# Patient Record
Sex: MALE | Race: BLACK OR AFRICAN AMERICAN | NOT HISPANIC OR LATINO
[De-identification: names, ages, dates, MRNs, and addresses within clinical notes are randomized per-mention and may not be internally consistent; named-entity substitution may affect disease eponyms.]

---

## 2017-03-16 ENCOUNTER — APPOINTMENT (OUTPATIENT)
Dept: UROLOGY | Facility: CLINIC | Age: 67
End: 2017-03-16

## 2017-03-16 VITALS
SYSTOLIC BLOOD PRESSURE: 127 MMHG | DIASTOLIC BLOOD PRESSURE: 74 MMHG | TEMPERATURE: 98.4 F | RESPIRATION RATE: 17 BRPM | HEART RATE: 84 BPM

## 2017-03-16 DIAGNOSIS — C61 MALIGNANT NEOPLASM OF PROSTATE: ICD-10-CM

## 2017-03-16 RX ORDER — NYSTATIN 100000 U/G
100000 OINTMENT TOPICAL
Qty: 30 | Refills: 0 | Status: COMPLETED | COMMUNITY
Start: 2016-09-26

## 2017-03-17 ENCOUNTER — TRANSCRIPTION ENCOUNTER (OUTPATIENT)
Age: 67
End: 2017-03-17

## 2017-04-11 ENCOUNTER — FORM ENCOUNTER (OUTPATIENT)
Age: 67
End: 2017-04-11

## 2017-04-12 ENCOUNTER — OUTPATIENT (OUTPATIENT)
Dept: OUTPATIENT SERVICES | Facility: HOSPITAL | Age: 67
LOS: 1 days | End: 2017-04-12
Payer: COMMERCIAL

## 2017-04-12 ENCOUNTER — APPOINTMENT (OUTPATIENT)
Dept: MRI IMAGING | Facility: CLINIC | Age: 67
End: 2017-04-12

## 2017-04-12 DIAGNOSIS — C61 MALIGNANT NEOPLASM OF PROSTATE: ICD-10-CM

## 2017-04-12 PROCEDURE — 72197 MRI PELVIS W/O & W/DYE: CPT

## 2017-04-12 PROCEDURE — 82565 ASSAY OF CREATININE: CPT

## 2017-04-13 ENCOUNTER — RESULT REVIEW (OUTPATIENT)
Age: 67
End: 2017-04-13

## 2017-04-17 ENCOUNTER — RESULT REVIEW (OUTPATIENT)
Age: 67
End: 2017-04-17

## 2017-04-17 LAB — PSA SERPL-MCNC: 10.23 NG/ML

## 2017-05-11 ENCOUNTER — MEDICATION RENEWAL (OUTPATIENT)
Age: 67
End: 2017-05-11

## 2017-05-11 ENCOUNTER — MESSAGE (OUTPATIENT)
Age: 67
End: 2017-05-11

## 2017-05-18 ENCOUNTER — OUTPATIENT (OUTPATIENT)
Dept: OUTPATIENT SERVICES | Facility: HOSPITAL | Age: 67
LOS: 1 days | End: 2017-05-18
Payer: COMMERCIAL

## 2017-05-18 ENCOUNTER — APPOINTMENT (OUTPATIENT)
Dept: UROLOGY | Facility: CLINIC | Age: 67
End: 2017-05-18

## 2017-05-18 VITALS
HEART RATE: 83 BPM | DIASTOLIC BLOOD PRESSURE: 69 MMHG | SYSTOLIC BLOOD PRESSURE: 130 MMHG | TEMPERATURE: 98 F | RESPIRATION RATE: 17 BRPM

## 2017-05-18 DIAGNOSIS — R35.0 FREQUENCY OF MICTURITION: ICD-10-CM

## 2017-05-18 PROCEDURE — 76872 US TRANSRECTAL: CPT

## 2017-05-18 PROCEDURE — 55700: CPT

## 2017-05-18 PROCEDURE — 76942 ECHO GUIDE FOR BIOPSY: CPT | Mod: 59

## 2017-05-18 RX ORDER — AMIKACIN SULFATE 250 MG/ML
500 INJECTION, SOLUTION INTRAMUSCULAR; INTRAVENOUS
Refills: 0 | Status: COMPLETED | OUTPATIENT
Start: 2017-05-18

## 2017-05-18 RX ORDER — AMIKACIN SULFATE 250 MG/ML
500 INJECTION, SOLUTION INTRAMUSCULAR; INTRAVENOUS
Qty: 2 | Refills: 0 | Status: COMPLETED | OUTPATIENT
Start: 2017-05-18 | End: 2017-05-18

## 2017-05-18 RX ADMIN — AMIKACIN SULFATE 0 MG/2ML: 250 INJECTION, SOLUTION INTRAMUSCULAR; INTRAVENOUS at 00:00

## 2017-05-19 ENCOUNTER — MESSAGE (OUTPATIENT)
Age: 67
End: 2017-05-19

## 2017-05-22 ENCOUNTER — OTHER (OUTPATIENT)
Age: 67
End: 2017-05-22

## 2017-05-22 LAB — CORE LAB BIOPSY: NORMAL

## 2017-05-22 RX ORDER — AMOXICILLIN AND CLAVULANATE POTASSIUM 875; 125 MG/1; MG/1
875-125 TABLET, COATED ORAL
Qty: 6 | Refills: 0 | Status: COMPLETED | COMMUNITY
Start: 2017-05-11 | End: 2017-05-22

## 2017-05-24 DIAGNOSIS — C61 MALIGNANT NEOPLASM OF PROSTATE: ICD-10-CM

## 2017-05-25 ENCOUNTER — APPOINTMENT (OUTPATIENT)
Dept: UROLOGY | Facility: CLINIC | Age: 67
End: 2017-05-25

## 2017-06-07 ENCOUNTER — APPOINTMENT (OUTPATIENT)
Dept: RADIATION ONCOLOGY | Facility: CLINIC | Age: 67
End: 2017-06-07

## 2017-06-07 VITALS
SYSTOLIC BLOOD PRESSURE: 133 MMHG | DIASTOLIC BLOOD PRESSURE: 83 MMHG | BODY MASS INDEX: 32.22 KG/M2 | OXYGEN SATURATION: 95 % | HEART RATE: 84 BPM | TEMPERATURE: 97.8 F | WEIGHT: 200.51 LBS | RESPIRATION RATE: 16 BRPM | HEIGHT: 66 IN

## 2017-06-20 ENCOUNTER — APPOINTMENT (OUTPATIENT)
Dept: UROLOGY | Facility: CLINIC | Age: 67
End: 2017-06-20

## 2017-10-09 ENCOUNTER — APPOINTMENT (OUTPATIENT)
Dept: UROLOGY | Facility: HOSPITAL | Age: 67
End: 2017-10-09

## 2017-10-23 ENCOUNTER — OUTPATIENT (OUTPATIENT)
Dept: OUTPATIENT SERVICES | Facility: HOSPITAL | Age: 67
LOS: 1 days | End: 2017-10-23
Payer: MEDICARE

## 2017-10-23 VITALS
WEIGHT: 205.03 LBS | TEMPERATURE: 98 F | HEART RATE: 63 BPM | DIASTOLIC BLOOD PRESSURE: 86 MMHG | SYSTOLIC BLOOD PRESSURE: 120 MMHG | RESPIRATION RATE: 16 BRPM | HEIGHT: 66 IN

## 2017-10-23 DIAGNOSIS — C61 MALIGNANT NEOPLASM OF PROSTATE: ICD-10-CM

## 2017-10-23 LAB
APPEARANCE UR: CLEAR — SIGNIFICANT CHANGE UP
BACTERIA # UR AUTO: SIGNIFICANT CHANGE UP
BILIRUB UR-MCNC: NEGATIVE — SIGNIFICANT CHANGE UP
BLD GP AB SCN SERPL QL: NEGATIVE — SIGNIFICANT CHANGE UP
BLOOD UR QL VISUAL: NEGATIVE — SIGNIFICANT CHANGE UP
BUN SERPL-MCNC: 12 MG/DL — SIGNIFICANT CHANGE UP (ref 7–23)
CALCIUM SERPL-MCNC: 9 MG/DL — SIGNIFICANT CHANGE UP (ref 8.4–10.5)
CHLORIDE SERPL-SCNC: 102 MMOL/L — SIGNIFICANT CHANGE UP (ref 98–107)
CO2 SERPL-SCNC: 23 MMOL/L — SIGNIFICANT CHANGE UP (ref 22–31)
COLOR SPEC: YELLOW — SIGNIFICANT CHANGE UP
CREAT SERPL-MCNC: 0.91 MG/DL — SIGNIFICANT CHANGE UP (ref 0.5–1.3)
GLUCOSE SERPL-MCNC: 86 MG/DL — SIGNIFICANT CHANGE UP (ref 70–99)
GLUCOSE UR-MCNC: NEGATIVE — SIGNIFICANT CHANGE UP
HCT VFR BLD CALC: 43.7 % — SIGNIFICANT CHANGE UP (ref 39–50)
HGB BLD-MCNC: 15.4 G/DL — SIGNIFICANT CHANGE UP (ref 13–17)
KETONES UR-MCNC: NEGATIVE — SIGNIFICANT CHANGE UP
LEUKOCYTE ESTERASE UR-ACNC: HIGH
MCHC RBC-ENTMCNC: 31 PG — SIGNIFICANT CHANGE UP (ref 27–34)
MCHC RBC-ENTMCNC: 35.2 % — SIGNIFICANT CHANGE UP (ref 32–36)
MCV RBC AUTO: 88.1 FL — SIGNIFICANT CHANGE UP (ref 80–100)
MUCOUS THREADS # UR AUTO: SIGNIFICANT CHANGE UP
NITRITE UR-MCNC: POSITIVE — HIGH
NRBC # FLD: 0 — SIGNIFICANT CHANGE UP
PH UR: 6 — SIGNIFICANT CHANGE UP (ref 4.6–8)
PLATELET # BLD AUTO: 239 K/UL — SIGNIFICANT CHANGE UP (ref 150–400)
PMV BLD: 10.8 FL — SIGNIFICANT CHANGE UP (ref 7–13)
POTASSIUM SERPL-MCNC: 3.9 MMOL/L — SIGNIFICANT CHANGE UP (ref 3.5–5.3)
POTASSIUM SERPL-SCNC: 3.9 MMOL/L — SIGNIFICANT CHANGE UP (ref 3.5–5.3)
PROT UR-MCNC: 10 — SIGNIFICANT CHANGE UP
RBC # BLD: 4.96 M/UL — SIGNIFICANT CHANGE UP (ref 4.2–5.8)
RBC # FLD: 13.2 % — SIGNIFICANT CHANGE UP (ref 10.3–14.5)
RH IG SCN BLD-IMP: POSITIVE — SIGNIFICANT CHANGE UP
SODIUM SERPL-SCNC: 141 MMOL/L — SIGNIFICANT CHANGE UP (ref 135–145)
SP GR SPEC: 1.02 — SIGNIFICANT CHANGE UP (ref 1–1.03)
UROBILINOGEN FLD QL: 1 E.U. — SIGNIFICANT CHANGE UP (ref 0.1–0.2)
WBC # BLD: 7.92 K/UL — SIGNIFICANT CHANGE UP (ref 3.8–10.5)
WBC # FLD AUTO: 7.92 K/UL — SIGNIFICANT CHANGE UP (ref 3.8–10.5)
WBC UR QL: SIGNIFICANT CHANGE UP (ref 0–?)

## 2017-10-23 PROCEDURE — 93010 ELECTROCARDIOGRAM REPORT: CPT

## 2017-10-23 RX ORDER — SODIUM CHLORIDE 9 MG/ML
1000 INJECTION, SOLUTION INTRAVENOUS
Qty: 0 | Refills: 0 | Status: DISCONTINUED | OUTPATIENT
Start: 2017-11-06 | End: 2017-11-07

## 2017-10-23 NOTE — H&P PST ADULT - ASSESSMENT
68 y/o male with for pmhx of BPH presents to [facility] for scheduled robotic assisted laparoscopic radical prostatectomy and possible lymph node dissection on 11/6/17. Patient report elevated PSA, sent to urologist for prostate biopsy resulting abnormal.

## 2017-10-23 NOTE — H&P PST ADULT - NSANTHOSAYNRD_GEN_A_CORE
No. RHETT screening performed.  STOP BANG Legend: 0-2 = LOW Risk; 3-4 = INTERMEDIATE Risk; 5-8 = HIGH Risk

## 2017-10-23 NOTE — H&P PST ADULT - PROBLEM SELECTOR PLAN 1
Patient scheduled for robotic assisted laparoscopic radical prostatectomy and possible lymph node dissection on 11/6/17.   preop instructions given and explained patient verbalized understanding   medical clearance requested as per surgeon Patient scheduled for robotic assisted laparoscopic radical prostatectomy and possible lymph node dissection on 11/6/17.   preop instructions given and explained patient verbalized understanding   medical clearance requested as per surgeon  meets RHETT precautions OR notified

## 2017-10-23 NOTE — H&P PST ADULT - HISTORY OF PRESENT ILLNESS
66 y/o male with for pmhx of BPH presents to [facility] for scheduled robotic assisted laparoscopic radical prostatectomy and possible lymph node dissection on 11/6/17. Patient report elevated PSA, sent to urologist for prostate biopsy resulting abnormal.

## 2017-10-23 NOTE — H&P PST ADULT - LYMPHATIC
posterior cervical L/posterior cervical R/anterior cervical R/supraclavicular L/anterior cervical L/supraclavicular R

## 2017-10-23 NOTE — H&P PST ADULT - MUSCULOSKELETAL
detailed exam no calf tenderness/no joint swelling/no joint erythema/ROM intact/normal strength/no joint warmth details…

## 2017-10-25 LAB — SPECIMEN SOURCE: SIGNIFICANT CHANGE UP

## 2017-10-26 ENCOUNTER — MEDICATION RENEWAL (OUTPATIENT)
Age: 67
End: 2017-10-26

## 2017-10-26 LAB
-  AMIKACIN: SIGNIFICANT CHANGE UP
-  AMPICILLIN/SULBACTAM: SIGNIFICANT CHANGE UP
-  AMPICILLIN: SIGNIFICANT CHANGE UP
-  AZTREONAM: SIGNIFICANT CHANGE UP
-  CEFAZOLIN: SIGNIFICANT CHANGE UP
-  CEFEPIME: SIGNIFICANT CHANGE UP
-  CEFOXITIN: SIGNIFICANT CHANGE UP
-  CEFTAZIDIME: SIGNIFICANT CHANGE UP
-  CEFTRIAXONE: SIGNIFICANT CHANGE UP
-  CIPROFLOXACIN: SIGNIFICANT CHANGE UP
-  ERTAPENEM: SIGNIFICANT CHANGE UP
-  GENTAMICIN: SIGNIFICANT CHANGE UP
-  IMIPENEM: SIGNIFICANT CHANGE UP
-  LEVOFLOXACIN: SIGNIFICANT CHANGE UP
-  MEROPENEM: SIGNIFICANT CHANGE UP
-  NITROFURANTOIN: SIGNIFICANT CHANGE UP
-  PIPERACILLIN/TAZOBACTAM: SIGNIFICANT CHANGE UP
-  TIGECYCLINE: SIGNIFICANT CHANGE UP
-  TOBRAMYCIN: SIGNIFICANT CHANGE UP
-  TRIMETHOPRIM/SULFAMETHOXAZOLE: SIGNIFICANT CHANGE UP
BACTERIA UR CULT: SIGNIFICANT CHANGE UP
METHOD TYPE: SIGNIFICANT CHANGE UP
ORGANISM # SPEC MICROSCOPIC CNT: SIGNIFICANT CHANGE UP
ORGANISM # SPEC MICROSCOPIC CNT: SIGNIFICANT CHANGE UP

## 2017-11-06 ENCOUNTER — INPATIENT (INPATIENT)
Facility: HOSPITAL | Age: 67
LOS: 1 days | Discharge: HOME | End: 2017-11-08
Attending: UROLOGY | Admitting: UROLOGY
Payer: MEDICARE

## 2017-11-06 ENCOUNTER — RESULT REVIEW (OUTPATIENT)
Age: 67
End: 2017-11-06

## 2017-11-06 ENCOUNTER — APPOINTMENT (OUTPATIENT)
Dept: UROLOGY | Facility: HOSPITAL | Age: 67
End: 2017-11-06

## 2017-11-06 VITALS
OXYGEN SATURATION: 94 % | WEIGHT: 205.03 LBS | RESPIRATION RATE: 18 BRPM | HEART RATE: 67 BPM | DIASTOLIC BLOOD PRESSURE: 75 MMHG | SYSTOLIC BLOOD PRESSURE: 118 MMHG | HEIGHT: 66 IN | TEMPERATURE: 98 F

## 2017-11-06 DIAGNOSIS — C61 MALIGNANT NEOPLASM OF PROSTATE: ICD-10-CM

## 2017-11-06 LAB — RH IG SCN BLD-IMP: POSITIVE — SIGNIFICANT CHANGE UP

## 2017-11-06 PROCEDURE — 55866 LAPS SURG PRST8ECT RPBIC RAD: CPT

## 2017-11-06 RX ORDER — CEFADROXIL 500 MG/1
500 CAPSULE ORAL TWICE DAILY
Qty: 10 | Refills: 0 | Status: COMPLETED | COMMUNITY
Start: 2017-10-26 | End: 2017-11-06

## 2017-11-06 RX ORDER — HYDROMORPHONE HYDROCHLORIDE 2 MG/ML
0.5 INJECTION INTRAMUSCULAR; INTRAVENOUS; SUBCUTANEOUS
Qty: 0 | Refills: 0 | Status: DISCONTINUED | OUTPATIENT
Start: 2017-11-06 | End: 2017-11-07

## 2017-11-06 RX ORDER — ONDANSETRON 8 MG/1
4 TABLET, FILM COATED ORAL ONCE
Qty: 0 | Refills: 0 | Status: DISCONTINUED | OUTPATIENT
Start: 2017-11-06 | End: 2017-11-07

## 2017-11-06 RX ADMIN — SODIUM CHLORIDE 30 MILLILITER(S): 9 INJECTION, SOLUTION INTRAVENOUS at 16:23

## 2017-11-06 NOTE — ASU PREOP CHECKLIST - 3.
pt stated has had jock itch last few days and is being treated with fungal cream for his buttock. Dr. Nunn [id number] made aware, will; see patient.

## 2017-11-07 LAB
ALBUMIN SERPL ELPH-MCNC: 3.8 G/DL — SIGNIFICANT CHANGE UP (ref 3.3–5)
ALP SERPL-CCNC: 89 U/L — SIGNIFICANT CHANGE UP (ref 40–120)
ALT FLD-CCNC: 20 U/L — SIGNIFICANT CHANGE UP (ref 4–41)
AST SERPL-CCNC: 19 U/L — SIGNIFICANT CHANGE UP (ref 4–40)
BILIRUB DIRECT SERPL-MCNC: 0.1 MG/DL — SIGNIFICANT CHANGE UP (ref 0.1–0.2)
BILIRUB SERPL-MCNC: 0.5 MG/DL — SIGNIFICANT CHANGE UP (ref 0.2–1.2)
BUN SERPL-MCNC: 11 MG/DL — SIGNIFICANT CHANGE UP (ref 7–23)
CALCIUM SERPL-MCNC: 8.3 MG/DL — LOW (ref 8.4–10.5)
CHLORIDE SERPL-SCNC: 102 MMOL/L — SIGNIFICANT CHANGE UP (ref 98–107)
CO2 SERPL-SCNC: 21 MMOL/L — LOW (ref 22–31)
CREAT SERPL-MCNC: 0.87 MG/DL — SIGNIFICANT CHANGE UP (ref 0.5–1.3)
GLUCOSE SERPL-MCNC: 159 MG/DL — HIGH (ref 70–99)
HCT VFR BLD CALC: 38.8 % — LOW (ref 39–50)
HGB BLD-MCNC: 14.2 G/DL — SIGNIFICANT CHANGE UP (ref 13–17)
MCHC RBC-ENTMCNC: 31.1 PG — SIGNIFICANT CHANGE UP (ref 27–34)
MCHC RBC-ENTMCNC: 36.6 % — HIGH (ref 32–36)
MCV RBC AUTO: 84.9 FL — SIGNIFICANT CHANGE UP (ref 80–100)
NRBC # FLD: 0 — SIGNIFICANT CHANGE UP
PLATELET # BLD AUTO: 216 K/UL — SIGNIFICANT CHANGE UP (ref 150–400)
PMV BLD: 10.3 FL — SIGNIFICANT CHANGE UP (ref 7–13)
POTASSIUM SERPL-MCNC: 4 MMOL/L — SIGNIFICANT CHANGE UP (ref 3.5–5.3)
POTASSIUM SERPL-SCNC: 4 MMOL/L — SIGNIFICANT CHANGE UP (ref 3.5–5.3)
PROT SERPL-MCNC: 7.2 G/DL — SIGNIFICANT CHANGE UP (ref 6–8.3)
RBC # BLD: 4.57 M/UL — SIGNIFICANT CHANGE UP (ref 4.2–5.8)
RBC # FLD: 12.9 % — SIGNIFICANT CHANGE UP (ref 10.3–14.5)
SODIUM SERPL-SCNC: 138 MMOL/L — SIGNIFICANT CHANGE UP (ref 135–145)
WBC # BLD: 12.35 K/UL — HIGH (ref 3.8–10.5)
WBC # FLD AUTO: 12.35 K/UL — HIGH (ref 3.8–10.5)

## 2017-11-07 PROCEDURE — 88309 TISSUE EXAM BY PATHOLOGIST: CPT | Mod: 26

## 2017-11-07 RX ORDER — SODIUM CHLORIDE 9 MG/ML
1000 INJECTION, SOLUTION INTRAVENOUS
Qty: 0 | Refills: 0 | Status: DISCONTINUED | OUTPATIENT
Start: 2017-11-07 | End: 2017-11-07

## 2017-11-07 RX ORDER — OXYCODONE AND ACETAMINOPHEN 5; 325 MG/1; MG/1
1 TABLET ORAL EVERY 4 HOURS
Qty: 0 | Refills: 0 | Status: DISCONTINUED | OUTPATIENT
Start: 2017-11-07 | End: 2017-11-08

## 2017-11-07 RX ORDER — OXYCODONE AND ACETAMINOPHEN 5; 325 MG/1; MG/1
2 TABLET ORAL EVERY 4 HOURS
Qty: 0 | Refills: 0 | Status: DISCONTINUED | OUTPATIENT
Start: 2017-11-07 | End: 2017-11-08

## 2017-11-07 RX ORDER — SODIUM CHLORIDE 9 MG/ML
100 INJECTION INTRAMUSCULAR; INTRAVENOUS; SUBCUTANEOUS EVERY 8 HOURS
Qty: 0 | Refills: 0 | Status: DISCONTINUED | OUTPATIENT
Start: 2017-11-07 | End: 2017-11-07

## 2017-11-07 RX ORDER — ONDANSETRON 8 MG/1
4 TABLET, FILM COATED ORAL ONCE
Qty: 0 | Refills: 0 | Status: DISCONTINUED | OUTPATIENT
Start: 2017-11-07 | End: 2017-11-07

## 2017-11-07 RX ORDER — OXYCODONE AND ACETAMINOPHEN 5; 325 MG/1; MG/1
1 TABLET ORAL ONCE
Qty: 0 | Refills: 0 | Status: DISCONTINUED | OUTPATIENT
Start: 2017-11-07 | End: 2017-11-07

## 2017-11-07 RX ORDER — OXYCODONE HYDROCHLORIDE 5 MG/1
5 TABLET ORAL EVERY 6 HOURS
Qty: 0 | Refills: 0 | Status: DISCONTINUED | OUTPATIENT
Start: 2017-11-07 | End: 2017-11-07

## 2017-11-07 RX ORDER — HYDROMORPHONE HYDROCHLORIDE 2 MG/ML
0.5 INJECTION INTRAMUSCULAR; INTRAVENOUS; SUBCUTANEOUS
Qty: 0 | Refills: 0 | Status: DISCONTINUED | OUTPATIENT
Start: 2017-11-07 | End: 2017-11-07

## 2017-11-07 RX ORDER — HEPARIN SODIUM 5000 [USP'U]/ML
5000 INJECTION INTRAVENOUS; SUBCUTANEOUS EVERY 8 HOURS
Qty: 0 | Refills: 0 | Status: DISCONTINUED | OUTPATIENT
Start: 2017-11-07 | End: 2017-11-08

## 2017-11-07 RX ORDER — ACETAMINOPHEN 500 MG
650 TABLET ORAL EVERY 6 HOURS
Qty: 0 | Refills: 0 | Status: DISCONTINUED | OUTPATIENT
Start: 2017-11-07 | End: 2017-11-08

## 2017-11-07 RX ORDER — HYDROMORPHONE HYDROCHLORIDE 2 MG/ML
1 INJECTION INTRAMUSCULAR; INTRAVENOUS; SUBCUTANEOUS EVERY 4 HOURS
Qty: 0 | Refills: 0 | Status: DISCONTINUED | OUTPATIENT
Start: 2017-11-07 | End: 2017-11-08

## 2017-11-07 RX ADMIN — OXYCODONE AND ACETAMINOPHEN 1 TABLET(S): 5; 325 TABLET ORAL at 13:35

## 2017-11-07 RX ADMIN — SODIUM CHLORIDE 125 MILLILITER(S): 9 INJECTION, SOLUTION INTRAVENOUS at 03:10

## 2017-11-07 RX ADMIN — HYDROMORPHONE HYDROCHLORIDE 0.5 MILLIGRAM(S): 2 INJECTION INTRAMUSCULAR; INTRAVENOUS; SUBCUTANEOUS at 03:00

## 2017-11-07 RX ADMIN — HEPARIN SODIUM 5000 UNIT(S): 5000 INJECTION INTRAVENOUS; SUBCUTANEOUS at 22:18

## 2017-11-07 RX ADMIN — HEPARIN SODIUM 5000 UNIT(S): 5000 INJECTION INTRAVENOUS; SUBCUTANEOUS at 05:53

## 2017-11-07 RX ADMIN — HYDROMORPHONE HYDROCHLORIDE 0.5 MILLIGRAM(S): 2 INJECTION INTRAMUSCULAR; INTRAVENOUS; SUBCUTANEOUS at 04:36

## 2017-11-07 RX ADMIN — OXYCODONE AND ACETAMINOPHEN 1 TABLET(S): 5; 325 TABLET ORAL at 22:45

## 2017-11-07 RX ADMIN — OXYCODONE AND ACETAMINOPHEN 1 TABLET(S): 5; 325 TABLET ORAL at 23:40

## 2017-11-07 RX ADMIN — OXYCODONE AND ACETAMINOPHEN 1 TABLET(S): 5; 325 TABLET ORAL at 14:05

## 2017-11-07 RX ADMIN — SODIUM CHLORIDE 75 MILLILITER(S): 9 INJECTION, SOLUTION INTRAVENOUS at 12:46

## 2017-11-07 RX ADMIN — HYDROMORPHONE HYDROCHLORIDE 0.5 MILLIGRAM(S): 2 INJECTION INTRAMUSCULAR; INTRAVENOUS; SUBCUTANEOUS at 03:15

## 2017-11-07 RX ADMIN — HEPARIN SODIUM 5000 UNIT(S): 5000 INJECTION INTRAVENOUS; SUBCUTANEOUS at 13:32

## 2017-11-07 RX ADMIN — SODIUM CHLORIDE 100 MILLILITER(S): 9 INJECTION INTRAMUSCULAR; INTRAVENOUS; SUBCUTANEOUS at 05:45

## 2017-11-07 NOTE — PROGRESS NOTE ADULT - SUBJECTIVE AND OBJECTIVE BOX
POD #1 (very late case last nite)    Afeb 150/77 79  100%RA    Pt has no c/o; feels well    Abd- soft NT ND; no flatus    wounds C&D    Herron 210  KRYSTA 40    OOB already

## 2017-11-08 ENCOUNTER — TRANSCRIPTION ENCOUNTER (OUTPATIENT)
Age: 67
End: 2017-11-08

## 2017-11-08 VITALS
DIASTOLIC BLOOD PRESSURE: 69 MMHG | HEART RATE: 85 BPM | SYSTOLIC BLOOD PRESSURE: 125 MMHG | RESPIRATION RATE: 18 BRPM | OXYGEN SATURATION: 99 % | TEMPERATURE: 99 F

## 2017-11-08 RX ADMIN — OXYCODONE AND ACETAMINOPHEN 1 TABLET(S): 5; 325 TABLET ORAL at 06:16

## 2017-11-08 RX ADMIN — OXYCODONE AND ACETAMINOPHEN 1 TABLET(S): 5; 325 TABLET ORAL at 07:07

## 2017-11-08 RX ADMIN — HEPARIN SODIUM 5000 UNIT(S): 5000 INJECTION INTRAVENOUS; SUBCUTANEOUS at 13:34

## 2017-11-08 RX ADMIN — OXYCODONE AND ACETAMINOPHEN 1 TABLET(S): 5; 325 TABLET ORAL at 14:07

## 2017-11-08 RX ADMIN — HEPARIN SODIUM 5000 UNIT(S): 5000 INJECTION INTRAVENOUS; SUBCUTANEOUS at 06:16

## 2017-11-08 NOTE — DISCHARGE NOTE ADULT - MEDICATION SUMMARY - MEDICATIONS TO TAKE
I will START or STAY ON the medications listed below when I get home from the hospital:    oxyCODONE-acetaminophen 5 mg-325 mg oral tablet  -- 1-2 tab(s) by mouth every 6 hours, As Needed -Moderate Pain (4 - 6) MDD:8  -- Indication: For pain

## 2017-11-08 NOTE — DISCHARGE NOTE ADULT - INSTRUCTIONS
Notify Dr Nolasco if you experience increase in pain not relieved with pain medication, any bright red blood or clots in catheter Notify Dr Nolasco if you experience increase in pain not relieved with pain medication, any bright red blood or clots in catheter, any redness, drainage or swelling around incisions.  Notify Dr Nolasco if develop a fever>100.5 or shaking chills or any nausea or vomiting.  Drink plenty of fluids.  Use over the counter stool softeners to assist with constipation which can be a side effect of your pain medication. as tolerated

## 2017-11-08 NOTE — DISCHARGE NOTE ADULT - CARE PLAN
Principal Discharge DX:	Malignant neoplasm of prostate  Goal:	prostatectomy  Instructions for follow-up, activity and diet:	as above; eat light until gas passes; may use fiber/stool softener to avoid constipation while taking pain meds; drink plenty of fluids; call office for appt to see your doctor next Ryanes; daysi as instructed; change dressing on drain site daily or as needed until dry then may remove

## 2017-11-08 NOTE — DISCHARGE NOTE ADULT - CARE PROVIDER_API CALL
Ganesh Nolasco), Urology  [address] [place] [address]  Phone: [phone]  Fax: [phone]

## 2017-11-08 NOTE — DISCHARGE NOTE ADULT - PATIENT PORTAL LINK FT
“You can access the FollowHealth Patient Portal, offered by [facility], by registering with the following website: [email or web address]”

## 2017-11-08 NOTE — DISCHARGE NOTE ADULT - HOSPITAL COURSE
Pt had RALP; did well; ambulating; did not pass gas but abd soft and yokasta food; KRYSTA removed; home

## 2017-11-08 NOTE — DISCHARGE NOTE ADULT - PLAN OF CARE
prostatectomy as above; eat light until gas passes; may use fiber/stool softener to avoid constipation while taking pain meds; drink plenty of fluids; call office for appt to see your doctor next Malou tavarez as instructed; change dressing on drain site daily or as needed until dry then may remove

## 2017-11-08 NOTE — DISCHARGE NOTE ADULT - CONDITIONS AT DISCHARGE
stable stable, tavarez to leg bag drainage draining without difficulty, oob tolerating diet stable, tavarez to leg bag drainage draining without difficulty, oob, tolerating diet abdominal lap sites steri strips DESTINI clean dry and intact, DSD to d/cd KRYSTA site intact

## 2017-11-08 NOTE — PROGRESS NOTE ADULT - SUBJECTIVE AND OBJECTIVE BOX
POD #2    Afeb 122/69  88  97%RA    Pt has no c/o  Abd- soft NT ND; no flatus     wounds C&D    Germaine 1900  KRYSTA 10

## 2017-11-10 LAB — SURGICAL PATHOLOGY STUDY: SIGNIFICANT CHANGE UP

## 2017-11-14 ENCOUNTER — TRANSCRIPTION ENCOUNTER (OUTPATIENT)
Age: 67
End: 2017-11-14

## 2017-11-14 ENCOUNTER — APPOINTMENT (OUTPATIENT)
Dept: UROLOGY | Facility: CLINIC | Age: 67
End: 2017-11-14
Payer: MEDICARE

## 2017-11-14 PROCEDURE — 99024 POSTOP FOLLOW-UP VISIT: CPT

## 2017-12-21 ENCOUNTER — APPOINTMENT (OUTPATIENT)
Dept: UROLOGY | Facility: CLINIC | Age: 67
End: 2017-12-21
Payer: MEDICARE

## 2017-12-21 PROCEDURE — 99024 POSTOP FOLLOW-UP VISIT: CPT

## 2017-12-22 LAB — PSA SERPL-MCNC: <0.01 NG/ML

## 2018-03-06 ENCOUNTER — APPOINTMENT (OUTPATIENT)
Dept: UROLOGY | Facility: CLINIC | Age: 68
End: 2018-03-06
Payer: MEDICARE

## 2018-03-06 PROCEDURE — 99214 OFFICE O/P EST MOD 30 MIN: CPT

## 2018-03-09 LAB — PSA SERPL-MCNC: <0.01 NG/ML

## 2018-04-10 ENCOUNTER — APPOINTMENT (OUTPATIENT)
Dept: UROLOGY | Facility: CLINIC | Age: 68
End: 2018-04-10

## 2018-07-23 PROBLEM — N40.0 BENIGN PROSTATIC HYPERPLASIA WITHOUT LOWER URINARY TRACT SYMPTOMS: Chronic | Status: ACTIVE | Noted: 2017-10-23

## 2018-07-23 PROBLEM — C61 MALIGNANT NEOPLASM OF PROSTATE: Chronic | Status: ACTIVE | Noted: 2017-10-23

## 2018-09-18 ENCOUNTER — APPOINTMENT (OUTPATIENT)
Dept: UROLOGY | Facility: CLINIC | Age: 68
End: 2018-09-18
Payer: MEDICARE

## 2018-09-20 ENCOUNTER — APPOINTMENT (OUTPATIENT)
Dept: UROLOGY | Facility: CLINIC | Age: 68
End: 2018-09-20
Payer: MEDICARE

## 2018-09-20 VITALS
HEIGHT: 66 IN | BODY MASS INDEX: 32.62 KG/M2 | HEART RATE: 60 BPM | TEMPERATURE: 97.7 F | RESPIRATION RATE: 17 BRPM | WEIGHT: 203 LBS | DIASTOLIC BLOOD PRESSURE: 77 MMHG | SYSTOLIC BLOOD PRESSURE: 118 MMHG

## 2018-09-20 PROCEDURE — 99214 OFFICE O/P EST MOD 30 MIN: CPT

## 2018-09-20 RX ORDER — NYSTATIN 100000 [USP'U]/G
100000 POWDER TOPICAL
Qty: 30 | Refills: 0 | Status: COMPLETED | COMMUNITY
Start: 2018-05-21

## 2018-09-20 RX ORDER — ECONAZOLE NITRATE 10 MG/G
1 CREAM TOPICAL
Qty: 85 | Refills: 0 | Status: COMPLETED | COMMUNITY
Start: 2018-08-06

## 2018-09-20 RX ORDER — AMMONIUM LACTATE 12 %
12 CREAM (GRAM) TOPICAL
Qty: 385 | Refills: 0 | Status: COMPLETED | COMMUNITY
Start: 2018-04-05

## 2018-09-20 RX ORDER — TRIAMCINOLONE ACETONIDE 1 MG/G
0.1 CREAM TOPICAL
Qty: 30 | Refills: 0 | Status: COMPLETED | COMMUNITY
Start: 2018-08-06

## 2018-09-20 RX ORDER — CICLOPIROX 7.7 MG/G
0.77 GEL TOPICAL
Qty: 45 | Refills: 0 | Status: COMPLETED | COMMUNITY
Start: 2018-04-05

## 2019-01-09 ENCOUNTER — MEDICATION RENEWAL (OUTPATIENT)
Age: 69
End: 2019-01-09

## 2019-03-10 ENCOUNTER — TRANSCRIPTION ENCOUNTER (OUTPATIENT)
Age: 69
End: 2019-03-10

## 2019-03-14 ENCOUNTER — APPOINTMENT (OUTPATIENT)
Dept: UROLOGY | Facility: CLINIC | Age: 69
End: 2019-03-14
Payer: MEDICARE

## 2019-03-14 VITALS
DIASTOLIC BLOOD PRESSURE: 80 MMHG | WEIGHT: 212 LBS | RESPIRATION RATE: 17 BRPM | SYSTOLIC BLOOD PRESSURE: 122 MMHG | TEMPERATURE: 98.5 F | BODY MASS INDEX: 34.07 KG/M2 | HEART RATE: 61 BPM | HEIGHT: 66 IN

## 2019-03-14 PROCEDURE — 99214 OFFICE O/P EST MOD 30 MIN: CPT

## 2019-03-14 RX ORDER — OXYBUTYNIN CHLORIDE 10 MG/1
10 TABLET, EXTENDED RELEASE ORAL
Qty: 30 | Refills: 11 | Status: COMPLETED | COMMUNITY
Start: 2017-12-21 | End: 2019-03-14

## 2019-03-15 LAB — PSA SERPL-MCNC: <0.01 NG/ML

## 2019-06-06 NOTE — H&P PST ADULT - SKIN/BREAST
POST-OP DIAGNOSIS:  Fracture of metacarpal bone of left hand 06-Jun-2019 15:33:22 4th and 5th Mala Bueno details…

## 2019-07-24 ENCOUNTER — TRANSCRIPTION ENCOUNTER (OUTPATIENT)
Age: 69
End: 2019-07-24

## 2019-08-08 ENCOUNTER — APPOINTMENT (OUTPATIENT)
Dept: UROLOGY | Facility: CLINIC | Age: 69
End: 2019-08-08

## 2020-02-21 ENCOUNTER — EMERGENCY (EMERGENCY)
Facility: HOSPITAL | Age: 70
LOS: 1 days | Discharge: HOME | End: 2020-02-21
Attending: EMERGENCY MEDICINE
Payer: COMMERCIAL

## 2020-02-21 VITALS
OXYGEN SATURATION: 96 % | RESPIRATION RATE: 16 BRPM | HEART RATE: 96 BPM | SYSTOLIC BLOOD PRESSURE: 98 MMHG | DIASTOLIC BLOOD PRESSURE: 56 MMHG | TEMPERATURE: 100 F

## 2020-02-21 VITALS
OXYGEN SATURATION: 94 % | DIASTOLIC BLOOD PRESSURE: 88 MMHG | HEART RATE: 116 BPM | TEMPERATURE: 103 F | SYSTOLIC BLOOD PRESSURE: 176 MMHG | WEIGHT: 210.1 LBS | HEIGHT: 66 IN | RESPIRATION RATE: 18 BRPM

## 2020-02-21 LAB
ALBUMIN SERPL ELPH-MCNC: 4 G/DL — SIGNIFICANT CHANGE UP (ref 3.3–5)
ALP SERPL-CCNC: 94 U/L — SIGNIFICANT CHANGE UP (ref 40–120)
ALT FLD-CCNC: 40 U/L — SIGNIFICANT CHANGE UP (ref 10–45)
ANION GAP SERPL CALC-SCNC: 16 MMOL/L — SIGNIFICANT CHANGE UP (ref 5–17)
AST SERPL-CCNC: 54 U/L — HIGH (ref 10–40)
BASOPHILS # BLD AUTO: 0.07 K/UL — SIGNIFICANT CHANGE UP (ref 0–0.2)
BASOPHILS NFR BLD AUTO: 0.5 % — SIGNIFICANT CHANGE UP (ref 0–2)
BILIRUB SERPL-MCNC: 1 MG/DL — SIGNIFICANT CHANGE UP (ref 0.2–1.2)
BUN SERPL-MCNC: 11 MG/DL — SIGNIFICANT CHANGE UP (ref 7–23)
CALCIUM SERPL-MCNC: 9.4 MG/DL — SIGNIFICANT CHANGE UP (ref 8.4–10.5)
CHLORIDE SERPL-SCNC: 96 MMOL/L — SIGNIFICANT CHANGE UP (ref 96–108)
CO2 SERPL-SCNC: 21 MMOL/L — LOW (ref 22–31)
CREAT SERPL-MCNC: 0.97 MG/DL — SIGNIFICANT CHANGE UP (ref 0.5–1.3)
EOSINOPHIL # BLD AUTO: 0.04 K/UL — SIGNIFICANT CHANGE UP (ref 0–0.5)
EOSINOPHIL NFR BLD AUTO: 0.3 % — SIGNIFICANT CHANGE UP (ref 0–6)
GLUCOSE SERPL-MCNC: 120 MG/DL — HIGH (ref 70–99)
HCT VFR BLD CALC: 43.1 % — SIGNIFICANT CHANGE UP (ref 39–50)
HGB BLD-MCNC: 15.2 G/DL — SIGNIFICANT CHANGE UP (ref 13–17)
IMM GRANULOCYTES NFR BLD AUTO: 0.4 % — SIGNIFICANT CHANGE UP (ref 0–1.5)
LYMPHOCYTES # BLD AUTO: 1.4 K/UL — SIGNIFICANT CHANGE UP (ref 1–3.3)
LYMPHOCYTES # BLD AUTO: 9.9 % — LOW (ref 13–44)
MCHC RBC-ENTMCNC: 31.1 PG — SIGNIFICANT CHANGE UP (ref 27–34)
MCHC RBC-ENTMCNC: 35.3 GM/DL — SIGNIFICANT CHANGE UP (ref 32–36)
MCV RBC AUTO: 88.1 FL — SIGNIFICANT CHANGE UP (ref 80–100)
MONOCYTES # BLD AUTO: 1.26 K/UL — HIGH (ref 0–0.9)
MONOCYTES NFR BLD AUTO: 8.9 % — SIGNIFICANT CHANGE UP (ref 2–14)
NEUTROPHILS # BLD AUTO: 11.29 K/UL — HIGH (ref 1.8–7.4)
NEUTROPHILS NFR BLD AUTO: 80 % — HIGH (ref 43–77)
NRBC # BLD: 0 /100 WBCS — SIGNIFICANT CHANGE UP (ref 0–0)
PLATELET # BLD AUTO: 212 K/UL — SIGNIFICANT CHANGE UP (ref 150–400)
POTASSIUM SERPL-MCNC: 5.2 MMOL/L — SIGNIFICANT CHANGE UP (ref 3.5–5.3)
POTASSIUM SERPL-SCNC: 5.2 MMOL/L — SIGNIFICANT CHANGE UP (ref 3.5–5.3)
PROT SERPL-MCNC: 9.5 G/DL — HIGH (ref 6–8.3)
RBC # BLD: 4.89 M/UL — SIGNIFICANT CHANGE UP (ref 4.2–5.8)
RBC # FLD: 12.9 % — SIGNIFICANT CHANGE UP (ref 10.3–14.5)
SODIUM SERPL-SCNC: 133 MMOL/L — LOW (ref 135–145)
WBC # BLD: 14.11 K/UL — HIGH (ref 3.8–10.5)
WBC # FLD AUTO: 14.11 K/UL — HIGH (ref 3.8–10.5)

## 2020-02-21 PROCEDURE — 71046 X-RAY EXAM CHEST 2 VIEWS: CPT | Mod: 26

## 2020-02-21 PROCEDURE — 96360 HYDRATION IV INFUSION INIT: CPT

## 2020-02-21 PROCEDURE — 99284 EMERGENCY DEPT VISIT MOD MDM: CPT | Mod: 25

## 2020-02-21 PROCEDURE — 99284 EMERGENCY DEPT VISIT MOD MDM: CPT

## 2020-02-21 PROCEDURE — 85027 COMPLETE CBC AUTOMATED: CPT

## 2020-02-21 PROCEDURE — 93010 ELECTROCARDIOGRAM REPORT: CPT

## 2020-02-21 PROCEDURE — 93005 ELECTROCARDIOGRAM TRACING: CPT

## 2020-02-21 PROCEDURE — 80053 COMPREHEN METABOLIC PANEL: CPT

## 2020-02-21 PROCEDURE — 71046 X-RAY EXAM CHEST 2 VIEWS: CPT

## 2020-02-21 RX ORDER — ACETAMINOPHEN 500 MG
975 TABLET ORAL ONCE
Refills: 0 | Status: COMPLETED | OUTPATIENT
Start: 2020-02-21 | End: 2020-02-21

## 2020-02-21 RX ORDER — SODIUM CHLORIDE 9 MG/ML
1000 INJECTION INTRAMUSCULAR; INTRAVENOUS; SUBCUTANEOUS ONCE
Refills: 0 | Status: COMPLETED | OUTPATIENT
Start: 2020-02-21 | End: 2020-02-21

## 2020-02-21 RX ORDER — IBUPROFEN 200 MG
600 TABLET ORAL ONCE
Refills: 0 | Status: COMPLETED | OUTPATIENT
Start: 2020-02-21 | End: 2020-02-21

## 2020-02-21 RX ADMIN — SODIUM CHLORIDE 1000 MILLILITER(S): 9 INJECTION INTRAMUSCULAR; INTRAVENOUS; SUBCUTANEOUS at 19:01

## 2020-02-21 RX ADMIN — SODIUM CHLORIDE 1000 MILLILITER(S): 9 INJECTION INTRAMUSCULAR; INTRAVENOUS; SUBCUTANEOUS at 18:31

## 2020-02-21 RX ADMIN — SODIUM CHLORIDE 1000 MILLILITER(S): 9 INJECTION INTRAMUSCULAR; INTRAVENOUS; SUBCUTANEOUS at 17:35

## 2020-02-21 RX ADMIN — Medication 600 MILLIGRAM(S): at 17:48

## 2020-02-21 RX ADMIN — Medication 975 MILLIGRAM(S): at 19:00

## 2020-02-21 RX ADMIN — Medication 600 MILLIGRAM(S): at 19:00

## 2020-02-21 RX ADMIN — Medication 975 MILLIGRAM(S): at 17:49

## 2020-02-21 NOTE — ED PROVIDER NOTE - NSFOLLOWUPINSTRUCTIONS_ED_ALL_ED_FT
Fever    A fever is an increase in the body's temperature above 100.4°F (38°C) or higher. In adults and children older than three months, a brief mild or moderate fever generally has no long-term effect, and it usually does not require treatment. Many times, fevers are the result of viral infections, which are self-resolving.  However, certain symptoms or diagnostic tests may suggest a bacterial infection that may respond to antibiotics. Take medications as directed by your health care provider.    SEEK IMMEDIATE MEDICAL CARE IF YOU OR YOUR CHILD HAVE ANY OF THE FOLLOWING SYMPTOMS : shortness of breath, seizure, rash/stiff neck/headache, severe abdominal pain, persistent vomiting, any signs of dehydration, or if your child has a fever for over five (5) days.    - Follow up with your primary care doctor in 1-2 days.    - Bring results with you to the appointment.   - Take tylenol 650mg or motrin 600mg every 6 hours for pain or fever.   - Return to the ED for new or worsening symptoms.

## 2020-02-21 NOTE — ED ADULT TRIAGE NOTE - IDEAL BODY WEIGHT(KG)
See e-advice from today--requesting STD testing for new partner this weekend. No symptoms. Appt scheduled at [facility] tomorrow at 725AM with Josephine Noyola.    65

## 2020-02-21 NOTE — ED ADULT NURSE REASSESSMENT NOTE - NS ED NURSE REASSESS COMMENT FT1
Pt is AAOx3. Resting comfortably. Stated "I feel much better than before". VSS. Temperature 99.9 Orally. IVL site wdl and patent.

## 2020-02-21 NOTE — ED PROVIDER NOTE - CLINICAL SUMMARY MEDICAL DECISION MAKING FREE TEXT BOX
70 year old male presents to the ED c/o x4 days fever, body aches, chills. Pt has no focal symptoms concerning for bacterial infection, will obtain CXR as pt has SpO2 95%. Otherwise will treat for a viral syndrome, provide Motrin and Tylenol, fluids, PO challenge then likely DC home. 70 year old male presents to the ED c/o x4 days fever, body aches, chills. Pt has no focal symptoms concerning for bacterial infection, will obtain CXR as pt has SpO2 95%. Otherwise will treat for a viral syndrome, provide Motrin and Tylenol, fluids, PO challenge then likely DC home.  Shannan: fever body aches, decreased appetite. no PMhx. no cough sob or cp. Will look for source with cxr and treat symptomatically and treat as viral.

## 2020-02-21 NOTE — ED PROVIDER NOTE - PHYSICAL EXAMINATION
General: No acute distress.  Heart: Regular rate and rhythm. No murmurs, rubs, or gallops.  Lungs: CTAB, no wheezes or rhonchi. No respiratory distress.   Abdomen: Soft, non-tender, non-distended. No organomegaly.  Eyes: PERRL, EOMI.  Neuro: AAOx4, no focal motor or sensory deficits. CN II-XII intact.  Extremities: No lower extremity swelling, 2+ DP and radial pulses.  Skin: No rashes or lesions.

## 2020-02-21 NOTE — ED PROVIDER NOTE - ATTENDING CONTRIBUTION TO CARE
I performed a history and physical exam of the patient and discussed their management with the resident and /or advanced care provider. I reviewed the resident and /or ACP's note and agree with the documented findings and plan of care. My medical decision making and observations are found above.  Lungs clear, abd soft,

## 2020-02-21 NOTE — ED PROVIDER NOTE - PROGRESS NOTE DETAILS
Trinh: patient tachycardia improved, feels better, tolerating PO, will dc Resident: Jone King – Pt was re-evaluated at bedside, VSS, feeling better overall. Results were discussed with patient as well as return precautions and follow up plan with PCP and/or specialist. Time was taken to answer any questions that the patient had before providing them with discharge paperwork. Pt comfortable after fluids, ready to go home.

## 2020-02-21 NOTE — ED ADULT NURSE NOTE - OBJECTIVE STATEMENT
70 year old male, a+ox4, presents to ED complaining of fevers, cough, chills, body aches, and decreased PO intake x2 days.  Denies SOB, chest pain, nausea/vomiting/diarrhea, sore throat, congestion, dizziness/lightheaded, dysuria , hematuria or urinary frequency. Denies foreign travel.  Lungs CTA unlabored, abd soft nontender, nondistended. No nuchal rigidity, reports some neck pain and a headache. Denies being around anyone sick. Wife that presents with pt is not sick.  skin hot dry and intact.

## 2020-02-21 NOTE — ED PROVIDER NOTE - OBJECTIVE STATEMENT
70 year old male with pmhx malignant prostate neoplasm, BPH presents to the ED c/o fever (Tmax 102F), chills x4 days. +HA, body aches. Denies cough, SOB, dysuria, abdominal pain, back pain, throat pain, ear pain. Denies sick contacts. No current daily meds. Pt notes normal appetite. Fmhx DM.

## 2020-02-21 NOTE — ED PROVIDER NOTE - PATIENT PORTAL LINK FT
You can access the FollowMyHealth Patient Portal offered by [facility] by registering at the following website: [email or web address]. By joining [facility]’s FollowMyHealth portal, you will also be able to view your health information using other applications (apps) compatible with our system.

## 2020-02-26 ENCOUNTER — APPOINTMENT (OUTPATIENT)
Dept: UROLOGY | Facility: CLINIC | Age: 70
End: 2020-02-26
Payer: MEDICARE

## 2020-02-26 VITALS
TEMPERATURE: 97.6 F | HEART RATE: 81 BPM | BODY MASS INDEX: 32.14 KG/M2 | SYSTOLIC BLOOD PRESSURE: 153 MMHG | WEIGHT: 200 LBS | DIASTOLIC BLOOD PRESSURE: 79 MMHG | RESPIRATION RATE: 17 BRPM | HEIGHT: 66 IN

## 2020-02-26 PROCEDURE — 99213 OFFICE O/P EST LOW 20 MIN: CPT

## 2020-02-26 NOTE — HISTORY OF PRESENT ILLNESS
[FreeTextEntry1] : Here for follow-up visit.  Last visit was March 2019.\par Has not had a recent PSA test.\par \par Remains on sildenafil for erectile dysfunction.  Has been taking 1 tablet, 20 mg, daily.  Will intermittently increase to 5 tablets, total of 100 mg, as needed for erections.  Feels that he is able to achieve a full erection however he is unable to maintain the erection.\par \par His urinary function otherwise is good.  He has very rare stress incontinence, does not wear any pads.  Minimal urinary frequency or urgency.  He denies gross hematuria, dysuria.  No abdominal pain or flank pain.

## 2020-02-26 NOTE — VITALS
[ECOG Performance Status: 0 - Fully active, able to carry on all pre-disease performance without restriction] : Performance Status: 0 - Fully active, able to carry on all pre-disease performance without restriction

## 2020-02-26 NOTE — DISEASE MANAGEMENT
[1] : T1 [c] : c [0-10] : 0 -10 ng/mL [I] : I [Active Surveillance] : Active Surveillance [Radical Prostatectomy] : Migrate  from Active Surveillance  to Radical Prostatectomy [Patient had a radical prostatectomy] : Patient had a radical prostatectomy  [6] : Sultan Score 6 [Negative] : Negative margins [2] : T2 [X] : MX [] : Patient had no Bone Scan performed [MeasuredProstateVolume] : 28 [BiopsyDate] : 3/23/2016 [TotalCores] : 12 [MaxCoreInvolvement] : 10 [TotalPositiveCores] : 3 [FreeTextEntry7] : PSA at diagnosis 8.1 2/18/2016  [LastPSADate] : 3/16/2017 [LastMRIResults] : 38 mL, left peripheral zone PIRAD 4, no LUIS [LastPSAResults] : 10.2 ng/mL [LastMRIDate] : 4/12/2017 [FirstSurveillanceBiopsyDate] : 5/18/2017 [FirstSurveillanceBiopsyResults] : MRI target negative, Sextant: Nathanael 7, 2/12 cores, Max 50% core involvement [RadicalProstatectomyDate] : 11/7/2017 [TotalNumberofnodesresected] : 0

## 2020-02-26 NOTE — DISEASE MANAGEMENT
[1] : T1 [c] : c [0-10] : 0 -10 ng/mL [I] : I [Active Surveillance] : Active Surveillance [Radical Prostatectomy] : Migrate  from Active Surveillance  to Radical Prostatectomy [6] : Lyons Score 6 [Patient had a radical prostatectomy] : Patient had a radical prostatectomy  [Negative] : Negative margins [X] : MX [2] : T2 [] : Patient had no Bone Scan performed [BiopsyDate] : 3/23/2016 [MeasuredProstateVolume] : 28 [TotalPositiveCores] : 3 [TotalCores] : 12 [MaxCoreInvolvement] : 10 [FreeTextEntry7] : PSA at diagnosis 8.1 2/18/2016  [LastPSADate] : 3/16/2017 [LastMRIDate] : 4/12/2017 [LastMRIResults] : 38 mL, left peripheral zone PIRAD 4, no LUIS [LastPSAResults] : 10.2 ng/mL [FirstSurveillanceBiopsyResults] : MRI target negative, Sextant: Nathanael 7, 2/12 cores, Max 50% core involvement [RadicalProstatectomyDate] : 11/7/2017 [FirstSurveillanceBiopsyDate] : 5/18/2017 [TotalNumberofnodesresected] : 0

## 2020-02-26 NOTE — PHYSICAL EXAM
[FreeTextEntry1] : Constitutional:  Well developed, normal appearance, no acute distress\par Cardiovascular:  Heart rate and rhythm were normal, no peripheral edema\par Pulmonary:  No respiratory distress, normal respiratory rhythm and effort, no accessory muscle use\par Abdomen: Soft, non tender, non distended, no costovertebral angle tenderness\par Musculoskeletal:  Gait and station were normal, no palpable tenderness over the spine or axial skeleton\par Skin:  Normal supple, no rashes\par Neurological:  no focal sensory or motor defects\par Lymphatics: no palpable adenopathy, no inguinal or femoral adenopathy

## 2020-02-26 NOTE — REASON FOR VISIT
[Follow-up Visit ___] : a follow-up visit  for [unfilled] [Routine Follow-Up] : a routine follow-up visit for prostate cancer

## 2020-04-20 ENCOUNTER — TRANSCRIPTION ENCOUNTER (OUTPATIENT)
Age: 70
End: 2020-04-20

## 2020-07-24 ENCOUNTER — TRANSCRIPTION ENCOUNTER (OUTPATIENT)
Age: 70
End: 2020-07-24

## 2020-07-24 RX ORDER — TADALAFIL 20 MG/1
20 TABLET ORAL DAILY
Qty: 6 | Refills: 11 | Status: COMPLETED | COMMUNITY
Start: 2020-02-26 | End: 2020-07-24

## 2021-08-25 ENCOUNTER — TRANSCRIPTION ENCOUNTER (OUTPATIENT)
Age: 71
End: 2021-08-25

## 2022-02-28 NOTE — VITALS
Forms are in your bin. Pt has wcc on 3/4 will  then.  I gave you 40 mins [ECOG Performance Status: 0 - Fully active, able to carry on all pre-disease performance without restriction] : Performance Status: 0 - Fully active, able to carry on all pre-disease performance without restriction

## 2022-03-30 ENCOUNTER — APPOINTMENT (OUTPATIENT)
Dept: SPINE | Facility: CLINIC | Age: 72
End: 2022-03-30

## 2022-11-03 ENCOUNTER — NON-APPOINTMENT (OUTPATIENT)
Age: 72
End: 2022-11-03

## 2022-11-12 ENCOUNTER — NON-APPOINTMENT (OUTPATIENT)
Age: 72
End: 2022-11-12

## 2022-12-07 ENCOUNTER — TRANSCRIPTION ENCOUNTER (OUTPATIENT)
Age: 72
End: 2022-12-07

## 2023-01-23 ENCOUNTER — APPOINTMENT (OUTPATIENT)
Dept: UROLOGY | Facility: CLINIC | Age: 73
End: 2023-01-23
Payer: MEDICARE

## 2023-01-23 VITALS
WEIGHT: 200 LBS | HEART RATE: 73 BPM | DIASTOLIC BLOOD PRESSURE: 85 MMHG | BODY MASS INDEX: 32.14 KG/M2 | HEIGHT: 66 IN | SYSTOLIC BLOOD PRESSURE: 137 MMHG | RESPIRATION RATE: 16 BRPM

## 2023-01-23 DIAGNOSIS — R35.0 FREQUENCY OF MICTURITION: ICD-10-CM

## 2023-01-23 DIAGNOSIS — Z85.46 PERSONAL HISTORY OF MALIGNANT NEOPLASM OF PROSTATE: ICD-10-CM

## 2023-01-23 DIAGNOSIS — N52.31 ERECTILE DYSFUNCTION FOLLOWING RADICAL PROSTATECTOMY: ICD-10-CM

## 2023-01-23 PROCEDURE — 99213 OFFICE O/P EST LOW 20 MIN: CPT

## 2023-01-23 RX ORDER — ATORVASTATIN CALCIUM 80 MG/1
TABLET, FILM COATED ORAL
Refills: 0 | Status: ACTIVE | COMMUNITY

## 2023-01-26 ENCOUNTER — TRANSCRIPTION ENCOUNTER (OUTPATIENT)
Age: 73
End: 2023-01-26

## 2023-01-31 ENCOUNTER — NON-APPOINTMENT (OUTPATIENT)
Age: 73
End: 2023-01-31

## 2023-01-31 RX ORDER — SILDENAFIL 20 MG/1
20 TABLET ORAL
Qty: 30 | Refills: 11 | Status: COMPLETED | COMMUNITY
Start: 2017-12-21 | End: 2023-01-31

## 2023-01-31 RX ORDER — TADALAFIL 20 MG/1
20 TABLET ORAL DAILY
Qty: 10 | Refills: 11 | Status: ACTIVE | COMMUNITY
Start: 2023-01-31 | End: 1900-01-01

## 2023-02-01 LAB — PSA SERPL-MCNC: <0.01 NG/ML

## 2023-02-01 NOTE — HISTORY OF PRESENT ILLNESS
[FreeTextEntry1] : Patient is a 72 year old man comes in today for a history of prostate cancer \par \par Reports stream is strong. Has increased frequency, denies urgency. Minimal episodes of stress incontinence. Notices at home he is urinating every hour but when he is out has less frequency.\par \par Erectile function: Using Sildenafil 100 mg, erections are not as rigid as before and do not last as long.\par \par \par

## 2023-02-01 NOTE — DISEASE MANAGEMENT
[1] : T1 [c] : c [0-10] : 0 -10 ng/mL [I] : I [Active Surveillance] : Active Surveillance [Radical Prostatectomy] : Migrate  from Active Surveillance  to Radical Prostatectomy [Patient had a radical prostatectomy] : Patient had a radical prostatectomy  [6] : [place] Score 6 [Negative] : Negative margins [2] : T2 [X] : MX [] : Patient had no Bone Scan performed [MeasuredProstateVolume] : 28 [TotalCores] : 12 [TotalPositiveCores] : 3 [MaxCoreInvolvement] : 10 [FreeTextEntry7] : PSA at diagnosis 8.1 2/18/2016  [LastPSADate] : 3/16/2017 [LastPSAResults] : 10.2 ng/mL [LastMRIDate] : 4/12/2017 [LastMRIResults] : 38 mL, left peripheral zone PIRAD 4, no ULIS [FirstSurveillanceBiopsyDate] : 5/18/2017 [FirstSurveillanceBiopsyResults] : MRI target negative, Sextant: Nathanael 7, 2/12 cores, Max 50% core involvement [RadicalProstatectomyDate] : 11/7/2017 [TotalNumberofnodesresected] : 0

## 2023-07-19 NOTE — REASON FOR VISIT
No [Follow-up Visit ___] : a follow-up visit  for [unfilled] [Routine Follow-Up] : a routine follow-up visit for prostate cancer

## 2024-09-08 ENCOUNTER — EMERGENCY (EMERGENCY)
Facility: HOSPITAL | Age: 74
LOS: 1 days | Discharge: HOME | End: 2024-09-08
Attending: EMERGENCY MEDICINE
Payer: COMMERCIAL

## 2024-09-08 VITALS
RESPIRATION RATE: 20 BRPM | HEART RATE: 67 BPM | WEIGHT: 195.11 LBS | TEMPERATURE: 98 F | SYSTOLIC BLOOD PRESSURE: 124 MMHG | DIASTOLIC BLOOD PRESSURE: 85 MMHG | HEIGHT: 66 IN | OXYGEN SATURATION: 96 %

## 2024-09-08 VITALS
RESPIRATION RATE: 16 BRPM | SYSTOLIC BLOOD PRESSURE: 133 MMHG | DIASTOLIC BLOOD PRESSURE: 73 MMHG | HEART RATE: 59 BPM | TEMPERATURE: 98 F | OXYGEN SATURATION: 100 %

## 2024-09-08 LAB
ALBUMIN SERPL ELPH-MCNC: 3.9 G/DL — SIGNIFICANT CHANGE UP (ref 3.3–5)
ALP SERPL-CCNC: 95 U/L — SIGNIFICANT CHANGE UP (ref 40–120)
ALT FLD-CCNC: 16 U/L — SIGNIFICANT CHANGE UP (ref 10–45)
ANION GAP SERPL CALC-SCNC: 13 MMOL/L — SIGNIFICANT CHANGE UP (ref 5–17)
APTT BLD: 28.3 SEC — SIGNIFICANT CHANGE UP (ref 24.5–35.6)
AST SERPL-CCNC: 15 U/L — SIGNIFICANT CHANGE UP (ref 10–40)
BASOPHILS # BLD AUTO: 0.02 K/UL — SIGNIFICANT CHANGE UP (ref 0–0.2)
BASOPHILS NFR BLD AUTO: 0.4 % — SIGNIFICANT CHANGE UP (ref 0–2)
BILIRUB SERPL-MCNC: 0.8 MG/DL — SIGNIFICANT CHANGE UP (ref 0.2–1.2)
BUN SERPL-MCNC: 9 MG/DL — SIGNIFICANT CHANGE UP (ref 7–23)
CALCIUM SERPL-MCNC: 9.4 MG/DL — SIGNIFICANT CHANGE UP (ref 8.4–10.5)
CHLORIDE SERPL-SCNC: 103 MMOL/L — SIGNIFICANT CHANGE UP (ref 96–108)
CO2 SERPL-SCNC: 22 MMOL/L — SIGNIFICANT CHANGE UP (ref 22–31)
CREAT SERPL-MCNC: 0.92 MG/DL — SIGNIFICANT CHANGE UP (ref 0.5–1.3)
D DIMER BLD IA.RAPID-MCNC: 229 NG/ML DDU — SIGNIFICANT CHANGE UP
EGFR: 88 ML/MIN/1.73M2 — SIGNIFICANT CHANGE UP
EOSINOPHIL # BLD AUTO: 0.08 K/UL — SIGNIFICANT CHANGE UP (ref 0–0.5)
EOSINOPHIL NFR BLD AUTO: 1.7 % — SIGNIFICANT CHANGE UP (ref 0–6)
GLUCOSE SERPL-MCNC: 112 MG/DL — HIGH (ref 70–99)
HCT VFR BLD CALC: 40.9 % — SIGNIFICANT CHANGE UP (ref 39–50)
HGB BLD-MCNC: 14.8 G/DL — SIGNIFICANT CHANGE UP (ref 13–17)
IMM GRANULOCYTES NFR BLD AUTO: 0.2 % — SIGNIFICANT CHANGE UP (ref 0–0.9)
INR BLD: 1.09 RATIO — SIGNIFICANT CHANGE UP (ref 0.85–1.18)
LIDOCAIN IGE QN: 14 U/L — SIGNIFICANT CHANGE UP (ref 7–60)
LYMPHOCYTES # BLD AUTO: 1.14 K/UL — SIGNIFICANT CHANGE UP (ref 1–3.3)
LYMPHOCYTES # BLD AUTO: 24.8 % — SIGNIFICANT CHANGE UP (ref 13–44)
MCHC RBC-ENTMCNC: 31.3 PG — SIGNIFICANT CHANGE UP (ref 27–34)
MCHC RBC-ENTMCNC: 36.2 GM/DL — HIGH (ref 32–36)
MCV RBC AUTO: 86.5 FL — SIGNIFICANT CHANGE UP (ref 80–100)
MONOCYTES # BLD AUTO: 0.7 K/UL — SIGNIFICANT CHANGE UP (ref 0–0.9)
MONOCYTES NFR BLD AUTO: 15.2 % — HIGH (ref 2–14)
NEUTROPHILS # BLD AUTO: 2.65 K/UL — SIGNIFICANT CHANGE UP (ref 1.8–7.4)
NEUTROPHILS NFR BLD AUTO: 57.7 % — SIGNIFICANT CHANGE UP (ref 43–77)
NRBC # BLD: 0 /100 WBCS — SIGNIFICANT CHANGE UP (ref 0–0)
PLATELET # BLD AUTO: 191 K/UL — SIGNIFICANT CHANGE UP (ref 150–400)
POTASSIUM SERPL-MCNC: 3.8 MMOL/L — SIGNIFICANT CHANGE UP (ref 3.5–5.3)
POTASSIUM SERPL-SCNC: 3.8 MMOL/L — SIGNIFICANT CHANGE UP (ref 3.5–5.3)
PROT SERPL-MCNC: 7.8 G/DL — SIGNIFICANT CHANGE UP (ref 6–8.3)
PROTHROM AB SERPL-ACNC: 11.4 SEC — SIGNIFICANT CHANGE UP (ref 9.5–13)
RBC # BLD: 4.73 M/UL — SIGNIFICANT CHANGE UP (ref 4.2–5.8)
RBC # FLD: 13.4 % — SIGNIFICANT CHANGE UP (ref 10.3–14.5)
SODIUM SERPL-SCNC: 138 MMOL/L — SIGNIFICANT CHANGE UP (ref 135–145)
TROPONIN T, HIGH SENSITIVITY RESULT: 12 NG/L — SIGNIFICANT CHANGE UP (ref 0–51)
TROPONIN T, HIGH SENSITIVITY RESULT: 9 NG/L — SIGNIFICANT CHANGE UP (ref 0–51)
WBC # BLD: 4.6 K/UL — SIGNIFICANT CHANGE UP (ref 3.8–10.5)
WBC # FLD AUTO: 4.6 K/UL — SIGNIFICANT CHANGE UP (ref 3.8–10.5)

## 2024-09-08 PROCEDURE — 85730 THROMBOPLASTIN TIME PARTIAL: CPT

## 2024-09-08 PROCEDURE — 71045 X-RAY EXAM CHEST 1 VIEW: CPT | Mod: 26

## 2024-09-08 PROCEDURE — 83690 ASSAY OF LIPASE: CPT

## 2024-09-08 PROCEDURE — 99285 EMERGENCY DEPT VISIT HI MDM: CPT | Mod: 25

## 2024-09-08 PROCEDURE — 84484 ASSAY OF TROPONIN QUANT: CPT

## 2024-09-08 PROCEDURE — 85610 PROTHROMBIN TIME: CPT

## 2024-09-08 PROCEDURE — 93005 ELECTROCARDIOGRAM TRACING: CPT

## 2024-09-08 PROCEDURE — 71045 X-RAY EXAM CHEST 1 VIEW: CPT

## 2024-09-08 PROCEDURE — 80053 COMPREHEN METABOLIC PANEL: CPT

## 2024-09-08 PROCEDURE — 85379 FIBRIN DEGRADATION QUANT: CPT

## 2024-09-08 PROCEDURE — 99285 EMERGENCY DEPT VISIT HI MDM: CPT

## 2024-09-08 PROCEDURE — 85025 COMPLETE CBC W/AUTO DIFF WBC: CPT

## 2024-09-08 PROCEDURE — 36415 COLL VENOUS BLD VENIPUNCTURE: CPT

## 2024-09-08 RX ORDER — LIDOCAINE/BENZALKONIUM/ALCOHOL
1 SOLUTION, NON-ORAL TOPICAL ONCE
Refills: 0 | Status: COMPLETED | OUTPATIENT
Start: 2024-09-08 | End: 2024-09-08

## 2024-09-08 RX ORDER — ASPIRIN 81 MG
162 TABLET, DELAYED RELEASE (ENTERIC COATED) ORAL ONCE
Refills: 0 | Status: COMPLETED | OUTPATIENT
Start: 2024-09-08 | End: 2024-09-08

## 2024-09-08 RX ORDER — ACETAMINOPHEN 325 MG/1
975 TABLET ORAL ONCE
Refills: 0 | Status: COMPLETED | OUTPATIENT
Start: 2024-09-08 | End: 2024-09-08

## 2024-09-08 RX ADMIN — ACETAMINOPHEN 975 MILLIGRAM(S): 325 TABLET ORAL at 09:10

## 2024-09-08 RX ADMIN — ACETAMINOPHEN 975 MILLIGRAM(S): 325 TABLET ORAL at 09:08

## 2024-09-08 RX ADMIN — Medication 162 MILLIGRAM(S): at 08:40

## 2024-09-08 RX ADMIN — Medication 1 PATCH: at 09:08

## 2024-09-08 NOTE — ED PROVIDER NOTE - OBJECTIVE STATEMENT
75 y/o male PMHx prostate cancer s/p prostectomy  no chemo/radiation 2019 currently takes no daily meds now presenting to the ED with acute onset right sided sharp chest pain since 5am this morning. Patient reported the pain occurred after waking up to use the bathroom was constant for 10 minutes now currently intermittent improving with severity after taking Tums /GasX well localized non pleuritic. Patient reported last stress 5 years ago reportedly negative. Has not taken aspirin today. Denied prolonged sedentary period, nausea, vomiting, exertional pain, diaphoresis, syncope, palpitations, headache, radiation of pain, pain with movement

## 2024-09-08 NOTE — ED PROVIDER NOTE - AVIAN FLU SYMPTOMS
Called to discuss issues with patient. Seems that she is not getting the appropriate mask from DME- she is using [employer] full face but has not been receiving those supplies from [facility]. Will reach out and ensure they send her appropriate supplies. She also feels that pressure is too low- per download she is on pressure of [id number] however her pressure was supposed to be adjusted to [id number]. Will change pressure today and also increase start pressure slightly to [address] She has an upcoming appointment with me on 7/21 so we will follow up to see how she tolerates adjustments.      Evelio Hartman, APRN - CNP      Orders Placed This Encounter   Procedures    DME - DURABLE MEDICAL EQUIPMENT     Change to start pressure of 8cmH2O and change APAP to [id number] No

## 2024-09-08 NOTE — ED PROVIDER NOTE - PATIENT PORTAL LINK FT
You can access the FollowMyHealth Patient Portal offered by [facility] by registering at the following website: [email or web address]. By joining [employer]’s FollowMyHealth portal, you will also be able to view your health information using other applications (apps) compatible with our system.

## 2024-09-08 NOTE — ED ADULT NURSE NOTE - PAIN RATING/NUMBER SCALE (0-10): ACTIVITY
Detail Level: Zone Render Risk Assessment In Note?: no Additional Notes: Pt aware of risk of scarring on face 3 (mild pain)

## 2024-09-08 NOTE — ED ADULT NURSE NOTE - NS ED NURSE LEVEL OF CONSCIOUSNESS AFFECT
[facility]  Internal Medicine Residency Program  History and Physical    Patient:  Garrett Wise 47 y.o. male MRN: [id number]     Date of Service: 1/23/2023    Hospital Day: 1      Chief complaint: had concerns including Extremity Weakness (Left upper and lower extremity flaccid, facial droop). History of Present Illness     The patient is a 47 y.o. male with a past medical history of poorly controlled type I DM, multiple lacunar infarcts, hypertension, hyperlipidemia and polysubstance use including alcohol who presented to the ED complaining of left-sided weakness with slurring of speech since 2 days prior to presentation. Patient noted left-sided weakness on waking up 1/21/2022 from sleep with difficulty moving his left upper limb and heaviness in the left lower limb. He also noticed slight facial droop on the left side with associated dysphagia. Symptoms progressively worsened with subsequent slowing of speech noticed the second day being yesterday necessitating his presentation to the ED for evaluation. He endorsed history of lightheadedness with orthostatic symptoms however no history of fall or syncopal episodes. Patient denies any palpitation, chest pain or shortness of breath at any point. He reported history of TIA in the past, per chart review was in 2018 with no significant carotid artery stenosis on evaluation. He currently denies any headache, vomiting or nausea but did endorse blurring of vision which has been happening for some weeks now. Patient was recently admitted on 11/14/2022 for chest pain where he was managed for DKA and discharged after 2 days on admission to continue on insulin pump, however its not been able to obtain insulin pump due lack of insurance coverage. He has had multiple admissions in the past for DKA on account of poorly controlled type I DM. He follows with Dr. Madelaine Martinez only in inpatient whenever he is admitted for DKA.   He has not followed with any doctor outpatient. In the ED, patient was hemodynamically stable with elevated blood pressure. Initial NIH stroke scale score at 1050 AM was 9. Initial laboratory evaluation was negative for evidence of DKA and CT head showed no acute intracranial process like hemorrhage unremarkable for old lacunar infarcts in the right and left basal ganglia. Past Medical History:      Diagnosis Date    Alcoholism ([facility]     Cocaine abuse ([facility]     Diabetes mellitus ([facility]     Hypertension     Idiopathic peripheral neuropathy 9/21/2016    Lacunar stroke ([facility]     Marijuana abuse        Past Surgical History:    History reviewed. No pertinent surgical history. Medications Prior to Admission:    Prior to Admission medications    Medication Sig Start Date End Date Taking? Authorizing Provider   Insulin Disposable Pump (OMNIPOD 5 G6 INTRO, GEN 5,) KIT To use as directed 11/17/22   Noah Johnson MD   Insulin Disposable Pump (OMNIPOD 5 G6 POD, GEN 5,) MISC To change every 72hrs 11/17/22   Noah Johnson MD   Continuous Blood Gluc Transmit (DEXCOM G6 TRANSMITTER) MISC To change every 90 days 11/17/22   Noah Johnson MD   Continuous Blood Gluc Sensor (DEXCOM G6 SENSOR) MISC To change eery 10 days 11/17/22   Noah Johnson MD   insulin glargine [facility]) 100 UNIT/ML injection pen Inject 17 Units into the skin nightly 11/16/22   Liss Rios MD   insulin lispro, 1 Unit Dial, ([facility]) 100 UNIT/ML SOPN Inject 4 Units into the skin 3 times daily (before meals) 11/16/22   Liss Rios MD   lisinopril (PRINIVIL;ZESTRIL) 5 MG tablet Take 1 tablet by mouth daily 11/16/22 12/16/22  Liss Rios MD   atorvastatin (LIPITOR) 40 MG tablet Take 1 tablet by mouth nightly 11/16/22   Liss Rios MD   Insulin Pen Needle 32G X 5 MM MISC 1 each by Does not apply route daily 11/16/22   Liss Rios MD   aspirin 81 MG EC tablet Take 1 tablet by mouth in the morning. 8/10/22   Pato Rosen MD   albuterol sulfate  (90 Base) MCG/ACT inhaler Inhale 2 puffs into the lungs every 6 hours as needed for Wheezing or Shortness of Breath 9/28/21   Clint Escalona MD       Allergies:  Metoprolol    Social History:   TOBACCO:   reports that he has been smoking cigarettes. He has a 22.50 pack-year smoking history. He has never used smokeless tobacco.  ETOH:   reports current alcohol use of about 6.0 standard drinks per week. Family History:       Problem Relation Age of Onset    Diabetes type 2  Mother     Cancer Maternal Grandmother     Diabetes type 2  Nephew        REVIEW OF SYSTEMS:    Constitutional: No fever, no chills, no change in weight; good appetite  HEENT: Positive for blurred vision, no ear problems, no sore throat, no rhinorrhea. Respiratory: No cough, no sputum production, no pleuritic chest pain, no shortness of breath  Cardiology: No angina, no dyspnea on exertion, no paroxysmal nocturnal dyspnea, no orthopnea, no palpitation, no leg swelling. Gastroenterology: Positive for dysphagia, no reflux; no abdominal pain, no nausea or vomiting; no constipation or diarrhea.  No hematochezia   Genitourinary: No dysuria, no frequency, hesitancy; no hematuria  Musculoskeletal: no joint pain, no myalgia  Hematology: no increased bleeding, no bruising, no lymphadenopathy  Skin: no skin changes noticed by patient  Psychology: no depressed mood, no suicidal ideation  Neurology: Positive for focal weakness in extremities, slurred speech, double vision, tingling or numbness sensation    Physical Exam   Vitals: BP (!) 151/96   Pulse 73   Temp 97 °F (36.1 °C)   Resp 17   Wt 145 lb (65.8 kg)   SpO2 94%   BMI 23.40 kg/m²     General Appearance: alert and oriented to person, place and time  Skin: warm and dry, no rash or erythema  Head: normocephalic and atraumatic  Eyes: pupils equal, round, and reactive to light, extraocular eye movements intact, conjunctivae normal  Neck: neck supple and non tender without mass   Pulmonary/Chest: clear to auscultation bilaterally- no wheezes, rales or rhonchi, normal air movement, no respiratory distress  Cardiovascular: normal rate, normal S1 and S2, intact distal pulses, and no carotid bruits  Abdomen: soft, non-tender, non-distended, normal bowel sounds, no masses or organomegaly  Extremities: no cyanosis, no clubbing, and no edema  Musculoskeletal: no swollen joints, no joint deformity, no tender joints, and no crepitus  Neurologic: NIH stroke scale score 8  Neurological Exam  Mental Status  Awake, alert and oriented to person, place and time. Recent and remote memory are intact. Mild dysarthria present. Language is fluent with no aphasia. Attention and concentration are normal.    Cranial Nerves  CN II: Visual acuity is normal. Visual fields full to confrontation. CN III, IV, VI: Extraocular movements intact bilaterally. Normal lids and orbits bilaterally. Pupils equal round and reactive to light bilaterally. CN V: Facial sensation is normal.  CN VII: Full and symmetric facial movement. CN VIII: Hearing is normal.  CN IX, X: Palate elevates symmetrically. Normal gag reflex. CN XI: Shoulder shrug strength is normal.  CN XII: Tongue midline without atrophy or fasciculations. Motor  Normal muscle bulk throughout. Normal muscle tone. Strength is 5/5 in all four extremities except as noted. No pronator drift. Left hemiparesis. LUE - 1/5  LLE - 3/5. Sensory  Light touch is normal in upper and lower extremities. Pinprick is normal in upper and lower extremities. Reflexes                                            Right                      Left  Plantar                           Equivocal                Equivocal    Right pathological reflexes: Ankle clonus absent. Left pathological reflexes: Ankle clonus absent.     Coordination  Right: Finger-to-nose normal. Heel-to-shin normal.Left: Finger-to-nose normal. Heel-to-shin normal.     Labs and Imaging Studies   Basic Labs  Recent Labs     01/23/23  1112 01/23/23  1114   NA  --  142   K  --  4.2   CL  --  108*   CO2  --  21*   BUN  --  16   CREATININE  --  0.8   GLUCOSE 192 198*   CALCIUM  --  9.3       Recent Labs     01/23/23  1114   WBC 8.3   RBC 4.59   HGB 13.3   HCT 39.5   MCV 86.1   MCH 29.0   MCHC 33.7   RDW 14.4   *   MPV 8.7     Imaging Studies:  XR CHEST PORTABLE   Final Result   No acute process. CT HEAD WO CONTRAST   Final Result   1. There is no acute intracranial abnormality. Specifically, there is no   intracranial hemorrhage. 2. Atrophy and periventricular leukomalacia,   3. Multiple old lacunar infarcts within the right and left basal ganglia more   prominent within the right basal ganglia. [place]             Resident's Assessment and Plan     Subacute ischemic stroke in the setting of poorly controlled TIDM  Late presentation to the ED, >24hrs since symptom onset  Risk stratification with A1c, lipid panel, TSH  Follow-up brain MRI  Follow neck CTA  Neurology consulted, follow recs  Ensure PT OT eval  Keep n.p.o. for now pending SLP evaluation and recs  Monitor neurological status closely    Type I DM, poorly controlled  Not able to obtain insulin pump due to insurance coverage  Home BG ranging in the 400s  Start on Lantus at 5 units nightly with LDSS  Monitor POCT BG every 4 hours, target -180    History of TIA 2018  Resume home aspirin    History of hypertension  Currently hypertensive  Resume home lisinopril, target SBP <180    History of HLD  Continue home atorvastatin    History of polysubstance use      PT/OT evaluation: Ordered  DVT prophylaxis/ GI prophylaxis: Lovenox/  Disposition: admit to telemetry floor    Sarath Goel MD, PGY-1  Attending physician: Dr. Caryl Sharp Calm

## 2024-09-08 NOTE — ED PROVIDER NOTE - ATTENDING APP SHARED VISIT CONTRIBUTION OF CARE
I, Dewayne Pereira MD, Emergency Medicine Attending Physician, personally saw and examined the patient and I personally made/approve the management plan and take responsibility for the patient management.    MDM: 74-year-old male with history of prostate cancer s/p prostatectomy 2019, who presents with right sided chest pain that started around 5 AM this morning after waking up to go use the restroom.  Patient with symptoms constant for 10 minutes with episodes of sharp spasm type pain that last for 5 seconds at a time.  Overall patient with improvement of symptoms after taking Tums.  Patient states last stress test was 5 years ago, reported to be negative.  Denies any exertional or pleuritic symptoms.  Denies any radiation.    ROS: denies trauma, fevers, chills, cough, shortness of breath, abdominal pain, nausea, vomiting, diarrhea, constipation, obstipation, dysuria, hematuria, flank pain, diaphoresis, dizziness, syncope, leg pain/swelling, paresthesia, numbness, or weakness.    On examination, patient with stable vitals, well-appearing, in no acute distress. Cardiac examination RRR, lungs CTAB with no W/R/R.  (+) Tenderness to the right anterior lateral chest wall over ribs 4-6, which reproduces patient's symptoms.  ABD: Abdomen soft, non-tender and non-distended, no rebound, no guarding, no rigidity. No CVA tenderness.  There is no calf tenderness or leg swelling. Negative Deya's sign.  Neurovascularly intact in all 4 extremities with 5/5 strength, normal sensation, equal pulses and brisk capillary refill, soft compartments.     Will evaluate patient for ACS. EKG does not show evidence of ST depressions or elevations. Breath sounds symmetric, not likely to be pneumothorax or pneumonia. No signs or symptoms concerning for aortic dissection. Will obtain EKG, CXR and labs including troponin. Will keep patient on cardiac monitor.    My independent interpretation of the EKG shows:  Normal sinus rhythm with rate of 61 bpm, normal axis, no ST elevations or depressions.  QTc 378.    Labs reviewed, nonactionable, troponin 12, will obtain repeat troponin. I, Dewayne Pereira MD, Emergency Medicine Attending Physician, personally saw and examined the patient and I personally made/approve the management plan and take responsibility for the patient management.    MDM: 74-year-old male with history of prostate cancer s/p prostatectomy 2019, who presents with right sided chest pain that started around 5 AM this morning after waking up to go use the restroom.  Patient with symptoms constant for 10 minutes with episodes of sharp spasm type pain that last for 5 seconds at a time.  Overall patient with improvement of symptoms after taking Tums.  Patient states last stress test was 5 years ago, reported to be negative.  Denies any exertional or pleuritic symptoms.  Denies any radiation.    ROS: denies trauma, fevers, chills, cough, shortness of breath, abdominal pain, nausea, vomiting, diarrhea, constipation, obstipation, dysuria, hematuria, flank pain, diaphoresis, dizziness, syncope, leg pain/swelling, paresthesia, numbness, or weakness.    On examination, patient with stable vitals, well-appearing, in no acute distress. Cardiac examination RRR, lungs CTAB with no W/R/R.  (+) Tenderness to the right anterior lateral chest wall over ribs 4-6, which reproduces patient's symptoms.  ABD: Abdomen soft, non-tender and non-distended, no rebound, no guarding, no rigidity. No CVA tenderness.  There is no calf tenderness or leg swelling. Negative Deya's sign.  Neurovascularly intact in all 4 extremities with 5/5 strength, normal sensation, equal pulses and brisk capillary refill, soft compartments.     Will evaluate patient for ACS. EKG does not show evidence of ST depressions or elevations. Breath sounds symmetric, not likely to be pneumothorax or pneumonia. No signs or symptoms concerning for aortic dissection. D-dimer to eval for PE. Will obtain EKG, CXR and labs including troponin. Will keep patient on cardiac monitor.    My independent interpretation of the EKG shows:  Normal sinus rhythm with rate of 61 bpm, normal axis, no ST elevations or depressions.  QTc 378.    Labs reviewed, nonactionable, d-dimer wnl, troponin 12, will obtain repeat troponin. Repeat troponin 9.    My independent interpretation of the chest x-ray images shows no pneumothorax, no focal consolidation.   More details to be seen in the official radiologist read.    At 11:30 AM, patient reports that their symptoms have improved. Stable vitals. Repeat cardiovascular examination shows NRRR, equal pulses in all 4 extremities. Repeat pulmonary examination shows equal bilateral lung sounds.  Stable for discharge with close follow-up and strict return precautions. Discussed the indications and side-effects of applicable medications.  Informed patient of all diagnostic test results including labs and imaging. The patient has been informed of all concerning signs and symptoms to return to Emergency Department, the necessity to follow up with PMD within 2-3 days was explained, or to return to the ED if unable to follow-up appropriately, and the patient reports understanding of above with capacity and insight.

## 2024-09-08 NOTE — ED ADULT NURSE NOTE - OBJECTIVE STATEMENT
74 year old male via triage with spouse at bedside for CP that started this morning at 0500.  Pt sates a sharp pain that is on the Right sided like a stabbing sharp pain that resolved.  Pt stated it started again while in the triage area again and  still has it now.  Pt endorsees getting vaccine in the right arm few days ago. Pt ahs no other symptoms no fever chills no SOB IV attempted bloods sent as ordered MPRN

## 2024-11-07 ENCOUNTER — EMERGENCY (EMERGENCY)
Facility: HOSPITAL | Age: 74
LOS: 1 days | Discharge: HOME | End: 2024-11-07
Attending: EMERGENCY MEDICINE
Payer: COMMERCIAL

## 2024-11-07 VITALS
WEIGHT: 190.04 LBS | TEMPERATURE: 99 F | HEART RATE: 71 BPM | SYSTOLIC BLOOD PRESSURE: 149 MMHG | DIASTOLIC BLOOD PRESSURE: 82 MMHG | HEIGHT: 66 IN | OXYGEN SATURATION: 96 % | RESPIRATION RATE: 17 BRPM

## 2024-11-07 LAB
ALBUMIN SERPL ELPH-MCNC: 4 G/DL — SIGNIFICANT CHANGE UP (ref 3.3–5)
ALP SERPL-CCNC: 86 U/L — SIGNIFICANT CHANGE UP (ref 40–120)
ALT FLD-CCNC: 13 U/L — SIGNIFICANT CHANGE UP (ref 10–45)
ANION GAP SERPL CALC-SCNC: 11 MMOL/L — SIGNIFICANT CHANGE UP (ref 5–17)
APTT BLD: 28.6 SEC — SIGNIFICANT CHANGE UP (ref 24.5–35.6)
AST SERPL-CCNC: 15 U/L — SIGNIFICANT CHANGE UP (ref 10–40)
BASOPHILS # BLD AUTO: 0.04 K/UL — SIGNIFICANT CHANGE UP (ref 0–0.2)
BASOPHILS NFR BLD AUTO: 0.7 % — SIGNIFICANT CHANGE UP (ref 0–2)
BILIRUB SERPL-MCNC: 0.7 MG/DL — SIGNIFICANT CHANGE UP (ref 0.2–1.2)
BUN SERPL-MCNC: 15 MG/DL — SIGNIFICANT CHANGE UP (ref 7–23)
CALCIUM SERPL-MCNC: 8.9 MG/DL — SIGNIFICANT CHANGE UP (ref 8.4–10.5)
CHLORIDE SERPL-SCNC: 109 MMOL/L — HIGH (ref 96–108)
CO2 SERPL-SCNC: 23 MMOL/L — SIGNIFICANT CHANGE UP (ref 22–31)
CREAT SERPL-MCNC: 0.91 MG/DL — SIGNIFICANT CHANGE UP (ref 0.5–1.3)
EGFR: 89 ML/MIN/1.73M2 — SIGNIFICANT CHANGE UP
EOSINOPHIL # BLD AUTO: 0.2 K/UL — SIGNIFICANT CHANGE UP (ref 0–0.5)
EOSINOPHIL NFR BLD AUTO: 3.3 % — SIGNIFICANT CHANGE UP (ref 0–6)
GLUCOSE SERPL-MCNC: 99 MG/DL — SIGNIFICANT CHANGE UP (ref 70–99)
HCT VFR BLD CALC: 38.1 % — LOW (ref 39–50)
HGB BLD-MCNC: 13.8 G/DL — SIGNIFICANT CHANGE UP (ref 13–17)
IMM GRANULOCYTES NFR BLD AUTO: 0.2 % — SIGNIFICANT CHANGE UP (ref 0–0.9)
INR BLD: 1.02 RATIO — SIGNIFICANT CHANGE UP (ref 0.85–1.16)
LYMPHOCYTES # BLD AUTO: 1.55 K/UL — SIGNIFICANT CHANGE UP (ref 1–3.3)
LYMPHOCYTES # BLD AUTO: 25.4 % — SIGNIFICANT CHANGE UP (ref 13–44)
MCHC RBC-ENTMCNC: 31.8 PG — SIGNIFICANT CHANGE UP (ref 27–34)
MCHC RBC-ENTMCNC: 36.2 G/DL — HIGH (ref 32–36)
MCV RBC AUTO: 87.8 FL — SIGNIFICANT CHANGE UP (ref 80–100)
MONOCYTES # BLD AUTO: 0.6 K/UL — SIGNIFICANT CHANGE UP (ref 0–0.9)
MONOCYTES NFR BLD AUTO: 9.8 % — SIGNIFICANT CHANGE UP (ref 2–14)
NEUTROPHILS # BLD AUTO: 3.71 K/UL — SIGNIFICANT CHANGE UP (ref 1.8–7.4)
NEUTROPHILS NFR BLD AUTO: 60.6 % — SIGNIFICANT CHANGE UP (ref 43–77)
NRBC # BLD: 0 /100 WBCS — SIGNIFICANT CHANGE UP (ref 0–0)
PLATELET # BLD AUTO: 233 K/UL — SIGNIFICANT CHANGE UP (ref 150–400)
POTASSIUM SERPL-MCNC: 4.6 MMOL/L — SIGNIFICANT CHANGE UP (ref 3.5–5.3)
POTASSIUM SERPL-SCNC: 4.6 MMOL/L — SIGNIFICANT CHANGE UP (ref 3.5–5.3)
PROT SERPL-MCNC: 7.4 G/DL — SIGNIFICANT CHANGE UP (ref 6–8.3)
PROTHROM AB SERPL-ACNC: 11.7 SEC — SIGNIFICANT CHANGE UP (ref 9.9–13.4)
RBC # BLD: 4.34 M/UL — SIGNIFICANT CHANGE UP (ref 4.2–5.8)
RBC # FLD: 13.2 % — SIGNIFICANT CHANGE UP (ref 10.3–14.5)
SODIUM SERPL-SCNC: 143 MMOL/L — SIGNIFICANT CHANGE UP (ref 135–145)
WBC # BLD: 6.11 K/UL — SIGNIFICANT CHANGE UP (ref 3.8–10.5)
WBC # FLD AUTO: 6.11 K/UL — SIGNIFICANT CHANGE UP (ref 3.8–10.5)

## 2024-11-07 PROCEDURE — 99285 EMERGENCY DEPT VISIT HI MDM: CPT

## 2024-11-07 RX ORDER — SODIUM CHLORIDE 9 MG/ML
1000 INJECTION, SOLUTION INTRAMUSCULAR; INTRAVENOUS; SUBCUTANEOUS ONCE
Refills: 0 | Status: COMPLETED | OUTPATIENT
Start: 2024-11-07 | End: 2024-11-07

## 2024-11-07 RX ADMIN — SODIUM CHLORIDE 1000 MILLILITER(S): 9 INJECTION, SOLUTION INTRAMUSCULAR; INTRAVENOUS; SUBCUTANEOUS at 22:53

## 2024-11-07 NOTE — ED PROVIDER NOTE - OBJECTIVE STATEMENT
74-year-old male PMH prostate cancer presenting with hematuria  Patient was sitting in the toilet today and had a bout of hematuria while defecating.  Patient noticed blood came from penis while showering pt states blood was dark in color.  Patient denies history of hematuria or any urological complications after prostatectomy which was conducted 4 years ago.  Patient states luna empty bladder after urinating.  Patient denies any weakness dizziness nausea vomiting dysuria fever chills back pain.

## 2024-11-07 NOTE — ED PROVIDER NOTE - ATTENDING CONTRIBUTION TO CARE
Patient is a 74-year-old male history of recent shingles had his prostate removed 5 6 years ago for prostate CA no further treatment needed with denies any urinary symptoms but tonight started experiencing hematuria flex blood with clots states he feels like is able to evacuate no pain not any blood thinners IV fluids labs bladder scan rule out retention will possibly need a Herron catheter and  for bladder irrigation

## 2024-11-07 NOTE — ED ADULT NURSE REASSESSMENT NOTE - NS ED NURSE REASSESS COMMENT FT1
Pt bladder scanned approx 56 cc noted on scanner. MD Gonzalez and MD Ray made aware state no interventions at this time. Comfort and safety provided.

## 2024-11-07 NOTE — ED PROVIDER NOTE - CARE PROVIDER_API CALL
Ganesh Nolasco.  Urology  [address] [place] [address], [place] [id number]  [place] [address]  Phone: [phone]  Fax: [phone]  Follow Up Time: Urgent

## 2024-11-07 NOTE — ED PROVIDER NOTE - PATIENT PORTAL LINK FT
You can access the FollowMyHealth Patient Portal offered by [facility] by registering at the following website: [email or web address]. By joining [employer]’s FollowMyHealth portal, you will also be able to view your health information using other applications (apps) compatible with our system.

## 2024-11-07 NOTE — ED PROVIDER NOTE - PHYSICAL EXAMINATION
GENERAL: NAD, lying in bed comfortably  HEART: Regular rate and rhythm, no murmurs, rubs, or gallops  LUNGS: Unlabored respirations.  Clear to auscultation bilaterally, no crackles, wheezing, or rhonchi  ABDOMEN: Soft, nontender, nondistended, +BS  : Goran Petersen MD: dry dark blood around uretrhal meatus no ulcerations edema erythema  EXTREMITIES: 2+ peripheral pulses bilaterally. No clubbing, cyanosis, or edema  NERVOUS SYSTEM:  A&Ox3, moving all extremities, no focal deficits   SKIN: No rashes or lesions

## 2024-11-07 NOTE — ED PROVIDER NOTE - NSFOLLOWUPINSTRUCTIONS_ED_ALL_ED_FT
You were seen in the emergency department for blood in your urine.  Your blood levels were within normal limits when looking at your hemoglobin.  Your creatinine which is a kidney marker was consistent with prior lab draws.  Your urine was negative for infection but positive for blood.  We would like for you to follow-up with your urologist Dr. Nolasco    Please return if you experience shortness of breath with ambulation palpitation weakness or any signs of extensive loss of blood.

## 2024-11-07 NOTE — ED ADULT NURSE NOTE - OBJECTIVE STATEMENT
74Y M AXO4 PMH of BPH and no pertinent PSH presented to the ED from home c/o hematuria with small clots 2 hours prior to arrival. Upon arrival to the ED, the pt is well appearing, has bilateral even and unlabored chest rise, and ambulatory with steady gait. Upon assessment, pt has even and bilateral peripheral pulses, ROM, PERRLA, gross neuro intact, and soft, non-tender, non-distended abdomen. Pt denies fevers, chills, chest pain, SOB, n/v/d, pain or burning on urination, dysuria, and black or bloody stools. Comfort and safety provided, bed in lowest position, side rails up, and call bell within reach.

## 2024-11-08 VITALS
RESPIRATION RATE: 18 BRPM | TEMPERATURE: 99 F | SYSTOLIC BLOOD PRESSURE: 135 MMHG | DIASTOLIC BLOOD PRESSURE: 76 MMHG | OXYGEN SATURATION: 97 % | HEART RATE: 75 BPM

## 2024-11-08 LAB
APPEARANCE UR: ABNORMAL
BACTERIA # UR AUTO: NEGATIVE /HPF — SIGNIFICANT CHANGE UP
BILIRUB UR-MCNC: NEGATIVE — SIGNIFICANT CHANGE UP
CAST: 0 /LPF — SIGNIFICANT CHANGE UP (ref 0–4)
COLOR SPEC: YELLOW — SIGNIFICANT CHANGE UP
DIFF PNL FLD: ABNORMAL
GLUCOSE UR QL: NEGATIVE MG/DL — SIGNIFICANT CHANGE UP
HAPTOGLOB SERPL-MCNC: 92 MG/DL — SIGNIFICANT CHANGE UP (ref 34–200)
KETONES UR-MCNC: NEGATIVE MG/DL — SIGNIFICANT CHANGE UP
LEUKOCYTE ESTERASE UR-ACNC: ABNORMAL
NITRITE UR-MCNC: NEGATIVE — SIGNIFICANT CHANGE UP
PH UR: 5.5 — SIGNIFICANT CHANGE UP (ref 5–8)
PROT UR-MCNC: SIGNIFICANT CHANGE UP MG/DL
RBC CASTS # UR COMP ASSIST: 994 /HPF — HIGH (ref 0–4)
SP GR SPEC: 1.02 — SIGNIFICANT CHANGE UP (ref 1–1.03)
SQUAMOUS # UR AUTO: 0 /HPF — SIGNIFICANT CHANGE UP (ref 0–5)
UROBILINOGEN FLD QL: 1 MG/DL — SIGNIFICANT CHANGE UP (ref 0.2–1)
WBC UR QL: 4 /HPF — SIGNIFICANT CHANGE UP (ref 0–5)

## 2024-11-08 PROCEDURE — 76770 US EXAM ABDO BACK WALL COMP: CPT

## 2024-11-08 PROCEDURE — 85730 THROMBOPLASTIN TIME PARTIAL: CPT

## 2024-11-08 PROCEDURE — 36000 PLACE NEEDLE IN VEIN: CPT

## 2024-11-08 PROCEDURE — 83010 ASSAY OF HAPTOGLOBIN QUANT: CPT

## 2024-11-08 PROCEDURE — 80053 COMPREHEN METABOLIC PANEL: CPT

## 2024-11-08 PROCEDURE — 87086 URINE CULTURE/COLONY COUNT: CPT

## 2024-11-08 PROCEDURE — 99284 EMERGENCY DEPT VISIT MOD MDM: CPT | Mod: 25

## 2024-11-08 PROCEDURE — 76770 US EXAM ABDO BACK WALL COMP: CPT | Mod: 26

## 2024-11-08 PROCEDURE — 85025 COMPLETE CBC W/AUTO DIFF WBC: CPT

## 2024-11-08 PROCEDURE — 81001 URINALYSIS AUTO W/SCOPE: CPT

## 2024-11-08 PROCEDURE — 85610 PROTHROMBIN TIME: CPT

## 2024-11-09 LAB
CULTURE RESULTS: SIGNIFICANT CHANGE UP
SPECIMEN SOURCE: SIGNIFICANT CHANGE UP

## 2024-11-13 ENCOUNTER — APPOINTMENT (OUTPATIENT)
Dept: UROLOGY | Facility: CLINIC | Age: 74
End: 2024-11-13
Payer: MEDICARE

## 2024-11-13 VITALS
TEMPERATURE: 97.9 F | DIASTOLIC BLOOD PRESSURE: 78 MMHG | OXYGEN SATURATION: 94 % | HEART RATE: 65 BPM | SYSTOLIC BLOOD PRESSURE: 144 MMHG | RESPIRATION RATE: 16 BRPM

## 2024-11-13 DIAGNOSIS — R31.0 GROSS HEMATURIA: ICD-10-CM

## 2024-11-13 PROCEDURE — 99214 OFFICE O/P EST MOD 30 MIN: CPT

## 2024-11-14 LAB
APPEARANCE: CLEAR
BACTERIA: NEGATIVE /HPF
BILIRUBIN URINE: NEGATIVE
BLOOD URINE: NEGATIVE
CAST: 0 /LPF
COLOR: YELLOW
EPITHELIAL CELLS: 0 /HPF
GLUCOSE QUALITATIVE U: NEGATIVE MG/DL
KETONES URINE: NEGATIVE MG/DL
LEUKOCYTE ESTERASE URINE: NEGATIVE
MICROSCOPIC-UA: NORMAL
NITRITE URINE: NEGATIVE
PH URINE: 5.5
PROTEIN URINE: NEGATIVE MG/DL
RED BLOOD CELLS URINE: 0 /HPF
SPECIFIC GRAVITY URINE: 1.02
URINE CYTOLOGY: NORMAL
UROBILINOGEN URINE: 0.2 MG/DL
WHITE BLOOD CELLS URINE: 0 /HPF

## 2024-11-21 ENCOUNTER — APPOINTMENT (OUTPATIENT)
Dept: CT IMAGING | Facility: IMAGING CENTER | Age: 74
End: 2024-11-21

## 2024-11-21 ENCOUNTER — OUTPATIENT (OUTPATIENT)
Dept: OUTPATIENT SERVICES | Facility: HOSPITAL | Age: 74
LOS: 1 days | End: 2024-11-21
Payer: COMMERCIAL

## 2024-11-21 DIAGNOSIS — R31.0 GROSS HEMATURIA: ICD-10-CM

## 2024-11-21 DIAGNOSIS — Z00.8 ENCOUNTER FOR OTHER GENERAL EXAMINATION: ICD-10-CM

## 2024-11-21 PROCEDURE — 74178 CT ABD&PLV WO CNTR FLWD CNTR: CPT

## 2024-11-21 PROCEDURE — 74178 CT ABD&PLV WO CNTR FLWD CNTR: CPT | Mod: 26

## 2024-11-25 NOTE — H&P PST ADULT - NEGATIVE GENERAL GENITOURINARY SYMPTOMS
Addended by: ROME SMITH on: 11/25/2024 02:20 PM     Modules accepted: Orders     no dysuria/no hematuria/no flank pain L/no flank pain R

## 2024-12-02 ENCOUNTER — APPOINTMENT (OUTPATIENT)
Dept: UROLOGY | Facility: CLINIC | Age: 74
End: 2024-12-02
Payer: MEDICARE

## 2024-12-02 ENCOUNTER — NON-APPOINTMENT (OUTPATIENT)
Age: 74
End: 2024-12-02

## 2024-12-02 VITALS
OXYGEN SATURATION: 97 % | DIASTOLIC BLOOD PRESSURE: 78 MMHG | RESPIRATION RATE: 18 BRPM | HEART RATE: 62 BPM | TEMPERATURE: 97.6 F | SYSTOLIC BLOOD PRESSURE: 136 MMHG

## 2024-12-02 DIAGNOSIS — R31.0 GROSS HEMATURIA: ICD-10-CM

## 2024-12-02 PROCEDURE — 52000 CYSTOURETHROSCOPY: CPT

## 2025-04-28 ENCOUNTER — TRANSCRIPTION ENCOUNTER (OUTPATIENT)
Age: 75
End: 2025-04-28

## 2025-07-01 NOTE — ED PROVIDER NOTE - NS ED MD DISPO DISCHARGE CCDA
This is a 76 year old male with previous choledocholithiasis status post ERCP 2020 and 11/2022 and cholecystectomy approximately 1980s who gastroenterology has been consulted for abdominal pain. Patient describes developing a fullness sensation diffusely in his abdomen approximately 24 hours prior to this presentation. Then describes developing crampy diffuse abdominal pain, 2-3 large bowel movements, and 1-2 episodes non bloody non bilious vomiting.     #Acute diffuse abdominal pain  - Unclear specific etiology regarding patient's presenting abdominal pain, however given history of choledocholithiasis requiring ERCP twice in the past, possible patient with recurrent episode  - Please obtain CBC, CMP  - Pending CT abdomen and pelvis with IV contrast  - Further recommendations pending the above evaluation      Messi Todd DO  Gastroenterology Fellow  GI Consult Pager Weekdays 7am-5pm: [phone]  Weeknights/Weekend/Holiday Coverage: Please Call the  for Contact Information  Follow Up Questions Welcome via [employer] Messenger This is a 76 year old male with previous choledocholithiasis status post ERCP 2020 and 11/2022 and cholecystectomy approximately 1980s who gastroenterology has been consulted for abdominal pain. Patient describes developing a fullness sensation diffusely in his abdomen approximately 24 hours prior to this presentation. Then describes developing crampy diffuse abdominal pain, 2-3 large bowel movements, and 1-2 episodes non bloody non bilious vomiting.     #Acute diffuse abdominal pain  - Unclear specific etiology regarding patient's presenting abdominal pain, however given history of choledocholithiasis requiring ERCP twice in the past, possible patient with recurrent episode  - Please obtain CBC, CMP, lipase  - Pending CT abdomen and pelvis with IV contrast  - Further recommendations pending the above evaluation      Messi Todd DO  Gastroenterology Fellow  GI Consult Pager Weekdays 7am-5pm: [phone]  Weeknights/Weekend/Holiday Coverage: Please Call the  for Contact Information  Follow Up Questions Welcome via [employer] Messenger This is a 76 year old male with previous choledocholithiasis status post ERCP 2020 and 11/2022 and cholecystectomy approximately 1980s who gastroenterology has been consulted for abdominal pain. Patient describes developing a fullness sensation diffusely in his abdomen approximately 24 hours prior to this presentation. Then describes developing crampy diffuse abdominal pain, 2-3 large bowel movements, and 1-2 episodes non bloody non bilious vomiting.     #Acute diffuse abdominal pain  - Unclear specific etiology regarding patient's presenting abdominal pain, however given history of choledocholithiasis requiring ERCP twice in the past, important to evaluate recurrent presentation   - CBC, CMP, lipase reviewed  - Pending CT abdomen and pelvis with IV contrast  - Further recommendations pending the above evaluation      Messi Todd DO  Gastroenterology Fellow  GI Consult Pager Weekdays 7am-5pm: [phone]  Weeknights/Weekend/Holiday Coverage: Please Call the  for Contact Information  Follow Up Questions Welcome via [employer] Messenger Patient/Caregiver provided printed discharge information.